# Patient Record
Sex: FEMALE | Race: WHITE | Employment: PART TIME | URBAN - METROPOLITAN AREA
[De-identification: names, ages, dates, MRNs, and addresses within clinical notes are randomized per-mention and may not be internally consistent; named-entity substitution may affect disease eponyms.]

---
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
## 2018-01-23 ENCOUNTER — TELEPHONE (OUTPATIENT)
Dept: FAMILY MEDICINE CLINIC | Age: 18
End: 2018-01-23

## 2018-01-23 DIAGNOSIS — E10.9 TYPE 1 DIABETES MELLITUS WITHOUT COMPLICATION (HCC): Primary | ICD-10-CM

## 2018-01-29 ENCOUNTER — TELEPHONE (OUTPATIENT)
Dept: PHARMACY | Age: 18
End: 2018-01-29

## 2018-02-13 ENCOUNTER — OFFICE VISIT (OUTPATIENT)
Dept: FAMILY MEDICINE CLINIC | Age: 18
End: 2018-02-13
Payer: COMMERCIAL

## 2018-02-13 VITALS
WEIGHT: 116.9 LBS | BODY MASS INDEX: 19.48 KG/M2 | HEART RATE: 113 BPM | RESPIRATION RATE: 16 BRPM | SYSTOLIC BLOOD PRESSURE: 125 MMHG | DIASTOLIC BLOOD PRESSURE: 85 MMHG

## 2018-02-13 DIAGNOSIS — E10.9 TYPE 1 DIABETES MELLITUS WITHOUT COMPLICATION (HCC): Primary | ICD-10-CM

## 2018-02-13 DIAGNOSIS — H93.90 EAR LESION: ICD-10-CM

## 2018-02-13 PROCEDURE — 99213 OFFICE O/P EST LOW 20 MIN: CPT | Performed by: FAMILY MEDICINE

## 2018-02-13 ASSESSMENT — PATIENT HEALTH QUESTIONNAIRE - PHQ9
SUM OF ALL RESPONSES TO PHQ QUESTIONS 1-9: 0
SUM OF ALL RESPONSES TO PHQ9 QUESTIONS 1 & 2: 0
1. LITTLE INTEREST OR PLEASURE IN DOING THINGS: 0
2. FEELING DOWN, DEPRESSED OR HOPELESS: 0

## 2018-02-13 NOTE — PROGRESS NOTES
Subjective:  Alexis Torres is in for continued evaluation and management. Her chronic medical problems include the following; diabetes. She has diabetes. She is currently on Levemir and a short acting insulin. She indicates that her fingerstick blood sugars are typically less then 200. I have recommended evaluation by endocrinology. She is also complaining of an infection on her left ear. She has a bump which is slightly painful. Respect to health maintenance, she is not sexually active. Review of systems per HPI, otherwise negative. Allergies; medications; past medical, surgical, family, and social history; and problem list reviewed as indicated in this encounter. Objective:  Vitals: Blood pressure 125/85, pulse 113, resp. rate 16, weight 116 lb 14.4 oz (53 kg), last menstrual period 01/20/2018, not currently breastfeeding. Constitutional: She is oriented to person, place, and time. She appears well-developed and well-nourished and in no acute distress. Ears: An exophytic nodular lesion is noted on the patient's left ear  Cardiovascular: Normal rate and regular rhythm, no murmur, rub, or gallop    Pulmonary/Chest: Effort normal and breath sounds normal. No rales or wheezes. No chest retraction. Extremities: no clubbing, cyanosis, or edema  Neurological:  CN II - XII grossly intact; no focal neurological deficits  Psychiatric:  Well groomed, well dressed. The patient maintains appropriate eye contact and does not appear to be responding to internal stimuli. No agitation      Assessment/ Plan / Medical Decision Making  1. Type 1 diabetes mellitus without complication [facility])  9 [facility] Kb Breanna Davis MD   2. Ear lesion  2018 [facility], MD       Medications, laboratory testing, imaging, consultation, and follow up as documented in this encounter. Her diabetes appears to be relatively well controlled.   I have recommended evaluation

## 2018-03-26 PROBLEM — H93.8X2 MASS OF LEFT EAR: Status: ACTIVE | Noted: 2018-03-26

## 2018-06-05 LAB
BASOPHILS ABSOLUTE: NORMAL /ΜL
BASOPHILS RELATIVE PERCENT: NORMAL %
CHOLESTEROL, TOTAL: 162 MG/DL
CHOLESTEROL/HDL RATIO: 2
CREATININE, URINE: 161.5
EOSINOPHILS ABSOLUTE: NORMAL /ΜL
EOSINOPHILS RELATIVE PERCENT: NORMAL %
HCT VFR BLD CALC: 40.5 % (ref 36–46)
HDLC SERPL-MCNC: 81 MG/DL (ref 35–70)
HEMOGLOBIN: 13.3 G/DL (ref 12–16)
LDL CHOLESTEROL CALCULATED: 71 MG/DL (ref 0–160)
LYMPHOCYTES ABSOLUTE: NORMAL /ΜL
LYMPHOCYTES RELATIVE PERCENT: NORMAL %
MCH RBC QN AUTO: NORMAL PG
MCHC RBC AUTO-ENTMCNC: NORMAL G/DL
MCV RBC AUTO: NORMAL FL
MICROALBUMIN/CREAT 24H UR: <12 MG/G{CREAT}
MICROALBUMIN/CREAT UR-RTO: NORMAL
MONOCYTES ABSOLUTE: NORMAL /ΜL
MONOCYTES RELATIVE PERCENT: NORMAL %
NEUTROPHILS ABSOLUTE: NORMAL /ΜL
NEUTROPHILS RELATIVE PERCENT: NORMAL %
PLATELET # BLD: 189 K/ΜL
PMV BLD AUTO: NORMAL FL
RBC # BLD: 4.88 10^6/ΜL
TRIGL SERPL-MCNC: 51 MG/DL
VLDLC SERPL CALC-MCNC: 10 MG/DL
WBC # BLD: 3.7 10^3/ML

## 2018-06-12 LAB
AVERAGE GLUCOSE: 184
HBA1C MFR BLD: 7.5 %

## 2018-07-23 ENCOUNTER — HOSPITAL ENCOUNTER (OUTPATIENT)
Age: 18
Setting detail: SPECIMEN
Discharge: HOME | End: 2018-07-23
Payer: COMMERCIAL

## 2018-07-23 ENCOUNTER — ANESTHESIA EVENT (OUTPATIENT)
Dept: OPERATING ROOM | Facility: CLINIC | Age: 18
End: 2018-07-23
Payer: COMMERCIAL

## 2018-07-23 ENCOUNTER — HOSPITAL ENCOUNTER (OUTPATIENT)
Facility: CLINIC | Age: 18
Setting detail: OUTPATIENT SURGERY
Discharge: HOME | End: 2018-07-23
Attending: PLASTIC SURGERY | Admitting: PLASTIC SURGERY
Payer: COMMERCIAL

## 2018-07-23 ENCOUNTER — ANESTHESIA (OUTPATIENT)
Dept: OPERATING ROOM | Facility: CLINIC | Age: 18
End: 2018-07-23
Payer: COMMERCIAL

## 2018-07-23 VITALS
DIASTOLIC BLOOD PRESSURE: 66 MMHG | TEMPERATURE: 97.2 F | WEIGHT: 110 LBS | SYSTOLIC BLOOD PRESSURE: 102 MMHG | HEIGHT: 65 IN | RESPIRATION RATE: 8 BRPM | OXYGEN SATURATION: 96 % | HEART RATE: 80 BPM | BODY MASS INDEX: 18.33 KG/M2

## 2018-07-23 VITALS
SYSTOLIC BLOOD PRESSURE: 91 MMHG | OXYGEN SATURATION: 99 % | RESPIRATION RATE: 18 BRPM | DIASTOLIC BLOOD PRESSURE: 52 MMHG

## 2018-07-23 DIAGNOSIS — G89.18 POST-OP PAIN: Primary | ICD-10-CM

## 2018-07-23 LAB
GLUCOSE BLD-MCNC: 126 MG/DL (ref 65–105)
GLUCOSE BLD-MCNC: 85 MG/DL (ref 65–105)

## 2018-07-23 PROCEDURE — 2709999900 HC NON-CHARGEABLE SUPPLY: Performed by: PLASTIC SURGERY

## 2018-07-23 PROCEDURE — 2500000003 HC RX 250 WO HCPCS: Performed by: PLASTIC SURGERY

## 2018-07-23 PROCEDURE — 2500000003 HC RX 250 WO HCPCS: Performed by: NURSE ANESTHETIST, CERTIFIED REGISTERED

## 2018-07-23 PROCEDURE — 2580000003 HC RX 258: Performed by: ANESTHESIOLOGY

## 2018-07-23 PROCEDURE — 7100000010 HC PHASE II RECOVERY - FIRST 15 MIN: Performed by: PLASTIC SURGERY

## 2018-07-23 PROCEDURE — 82947 ASSAY GLUCOSE BLOOD QUANT: CPT

## 2018-07-23 PROCEDURE — 3700000001 HC ADD 15 MINUTES (ANESTHESIA): Performed by: PLASTIC SURGERY

## 2018-07-23 PROCEDURE — 6360000002 HC RX W HCPCS: Performed by: NURSE ANESTHETIST, CERTIFIED REGISTERED

## 2018-07-23 PROCEDURE — 3600000003 HC SURGERY LEVEL 3 BASE: Performed by: PLASTIC SURGERY

## 2018-07-23 PROCEDURE — 6360000002 HC RX W HCPCS: Performed by: ANESTHESIOLOGY

## 2018-07-23 PROCEDURE — 3700000000 HC ANESTHESIA ATTENDED CARE: Performed by: PLASTIC SURGERY

## 2018-07-23 PROCEDURE — 3600000013 HC SURGERY LEVEL 3 ADDTL 15MIN: Performed by: PLASTIC SURGERY

## 2018-07-23 PROCEDURE — 84703 CHORIONIC GONADOTROPIN ASSAY: CPT

## 2018-07-23 PROCEDURE — 7100000011 HC PHASE II RECOVERY - ADDTL 15 MIN: Performed by: PLASTIC SURGERY

## 2018-07-23 RX ORDER — PROPOFOL 10 MG/ML
INJECTION, EMULSION INTRAVENOUS PRN
Status: DISCONTINUED | OUTPATIENT
Start: 2018-07-23 | End: 2018-07-23 | Stop reason: SDUPTHER

## 2018-07-23 RX ORDER — CEFAZOLIN SODIUM 1 G/50ML
INJECTION, SOLUTION INTRAVENOUS PRN
Status: DISCONTINUED | OUTPATIENT
Start: 2018-07-23 | End: 2018-07-23 | Stop reason: SDUPTHER

## 2018-07-23 RX ORDER — MEPERIDINE HYDROCHLORIDE 50 MG/ML
12.5 INJECTION INTRAMUSCULAR; INTRAVENOUS; SUBCUTANEOUS EVERY 5 MIN PRN
Status: DISCONTINUED | OUTPATIENT
Start: 2018-07-23 | End: 2018-07-23 | Stop reason: HOSPADM

## 2018-07-23 RX ORDER — SODIUM CHLORIDE 0.9 % (FLUSH) 0.9 %
10 SYRINGE (ML) INJECTION EVERY 12 HOURS SCHEDULED
Status: DISCONTINUED | OUTPATIENT
Start: 2018-07-23 | End: 2018-07-23 | Stop reason: HOSPADM

## 2018-07-23 RX ORDER — BUPIVACAINE HYDROCHLORIDE AND EPINEPHRINE 5; 5 MG/ML; UG/ML
INJECTION, SOLUTION EPIDURAL; INTRACAUDAL; PERINEURAL PRN
Status: DISCONTINUED | OUTPATIENT
Start: 2018-07-23 | End: 2018-07-23 | Stop reason: HOSPADM

## 2018-07-23 RX ORDER — SODIUM CHLORIDE 0.9 % (FLUSH) 0.9 %
10 SYRINGE (ML) INJECTION PRN
Status: DISCONTINUED | OUTPATIENT
Start: 2018-07-23 | End: 2018-07-23 | Stop reason: HOSPADM

## 2018-07-23 RX ORDER — FENTANYL CITRATE 50 UG/ML
25 INJECTION, SOLUTION INTRAMUSCULAR; INTRAVENOUS EVERY 5 MIN PRN
Status: DISCONTINUED | OUTPATIENT
Start: 2018-07-23 | End: 2018-07-23 | Stop reason: HOSPADM

## 2018-07-23 RX ORDER — FENTANYL CITRATE 50 UG/ML
INJECTION, SOLUTION INTRAMUSCULAR; INTRAVENOUS PRN
Status: DISCONTINUED | OUTPATIENT
Start: 2018-07-23 | End: 2018-07-23 | Stop reason: SDUPTHER

## 2018-07-23 RX ORDER — CEPHALEXIN 500 MG/1
500 CAPSULE ORAL 3 TIMES DAILY
Qty: 21 CAPSULE | Refills: 0 | Status: SHIPPED | OUTPATIENT
Start: 2018-07-23 | End: 2020-10-20

## 2018-07-23 RX ORDER — LIDOCAINE HYDROCHLORIDE 10 MG/ML
INJECTION, SOLUTION EPIDURAL; INFILTRATION; INTRACAUDAL; PERINEURAL PRN
Status: DISCONTINUED | OUTPATIENT
Start: 2018-07-23 | End: 2018-07-23 | Stop reason: SDUPTHER

## 2018-07-23 RX ORDER — CEPHALEXIN 500 MG/1
500 CAPSULE ORAL 3 TIMES DAILY
Qty: 21 CAPSULE | Refills: 0 | Status: SHIPPED | OUTPATIENT
Start: 2018-07-23 | End: 2018-07-23

## 2018-07-23 RX ORDER — MIDAZOLAM HYDROCHLORIDE 1 MG/ML
1 INJECTION INTRAMUSCULAR; INTRAVENOUS EVERY 10 MIN PRN
Status: DISCONTINUED | OUTPATIENT
Start: 2018-07-23 | End: 2018-07-23 | Stop reason: HOSPADM

## 2018-07-23 RX ORDER — SODIUM CHLORIDE, SODIUM LACTATE, POTASSIUM CHLORIDE, CALCIUM CHLORIDE 600; 310; 30; 20 MG/100ML; MG/100ML; MG/100ML; MG/100ML
INJECTION, SOLUTION INTRAVENOUS CONTINUOUS
Status: DISCONTINUED | OUTPATIENT
Start: 2018-07-23 | End: 2018-07-23 | Stop reason: HOSPADM

## 2018-07-23 RX ORDER — LIDOCAINE HYDROCHLORIDE 10 MG/ML
1 INJECTION, SOLUTION EPIDURAL; INFILTRATION; INTRACAUDAL; PERINEURAL
Status: DISCONTINUED | OUTPATIENT
Start: 2018-07-23 | End: 2018-07-23 | Stop reason: HOSPADM

## 2018-07-23 RX ORDER — HYDROCODONE BITARTRATE AND ACETAMINOPHEN 5; 325 MG/1; MG/1
1 TABLET ORAL EVERY 6 HOURS PRN
Qty: 20 TABLET | Refills: 0 | Status: SHIPPED | OUTPATIENT
Start: 2018-07-23 | End: 2018-07-28

## 2018-07-23 RX ADMIN — PROPOFOL 20 MG: 10 INJECTION, EMULSION INTRAVENOUS at 13:26

## 2018-07-23 RX ADMIN — SODIUM CHLORIDE, POTASSIUM CHLORIDE, SODIUM LACTATE AND CALCIUM CHLORIDE: 600; 310; 30; 20 INJECTION, SOLUTION INTRAVENOUS at 12:13

## 2018-07-23 RX ADMIN — PROPOFOL 20 MG: 10 INJECTION, EMULSION INTRAVENOUS at 13:22

## 2018-07-23 RX ADMIN — PROPOFOL 20 MG: 10 INJECTION, EMULSION INTRAVENOUS at 13:32

## 2018-07-23 RX ADMIN — FENTANYL CITRATE 25 MCG: 50 INJECTION INTRAMUSCULAR; INTRAVENOUS at 13:14

## 2018-07-23 RX ADMIN — PROPOFOL 120 MG: 10 INJECTION, EMULSION INTRAVENOUS at 13:17

## 2018-07-23 RX ADMIN — PROPOFOL 20 MG: 10 INJECTION, EMULSION INTRAVENOUS at 13:24

## 2018-07-23 RX ADMIN — MIDAZOLAM HYDROCHLORIDE 1 MG: 1 INJECTION, SOLUTION INTRAMUSCULAR; INTRAVENOUS at 13:12

## 2018-07-23 RX ADMIN — PROPOFOL 20 MG: 10 INJECTION, EMULSION INTRAVENOUS at 13:29

## 2018-07-23 RX ADMIN — LIDOCAINE HYDROCHLORIDE 25 MG: 10 INJECTION, SOLUTION EPIDURAL; INFILTRATION; INTRACAUDAL at 13:16

## 2018-07-23 RX ADMIN — PROPOFOL 20 MG: 10 INJECTION, EMULSION INTRAVENOUS at 13:19

## 2018-07-23 RX ADMIN — CEFAZOLIN SODIUM 2 G: 1 INJECTION, SOLUTION INTRAVENOUS at 13:14

## 2018-07-23 ASSESSMENT — PULMONARY FUNCTION TESTS
PIF_VALUE: 0
PIF_VALUE: 2
PIF_VALUE: 0

## 2018-07-23 ASSESSMENT — PAIN - FUNCTIONAL ASSESSMENT: PAIN_FUNCTIONAL_ASSESSMENT: 0-10

## 2018-07-23 ASSESSMENT — PAIN SCALES - GENERAL: PAINLEVEL_OUTOF10: 0

## 2018-07-23 NOTE — H&P
HPI: Pt is a 21 y.o. female who presents to the office today for lesion to posterior left auricle. She had previous trauma to the area several years back. Skin mass is exophytic, circular, vascular, 0.5 cm in height and 1 cm circumference with rolled edges. Pt states it has been there for years and has grown in size. Pediatrician previously tried to drain it about 1.5 years ago and pt states it has gotten larger since then.      Past Medical History        Past Medical History:   Diagnosis Date    Diabetes mellitus type 1 ([facility] [address]       date of Dx November 2009    Polyuria      Visual disturbances              Past Surgical History   History reviewed. No pertinent surgical history.        Current Facility-Administered Medications          Current Outpatient Prescriptions   Medication Sig Dispense Refill    ONE TOUCH ULTRA TEST strip TEST 4 TO 6 TIMES PER  strip 3    insulin detemir (LEVEMIR FLEXTOUCH) 100 UNIT/ML injection pen Inject up to 40 units nightly as directed. Dispense 15 pens (3 boxes) for 90 day supply. (Patient taking differently: Inject up to 19 units nightly as directed. Dispense 15 pens (3 boxes) for 90 day supply. ) 15 Pen 2    insulin aspart (NOVOLOG FLEXPEN) 100 UNIT/ML injection pen Inject prn as directed up to 40 units per day. 15 Pen 2    glucagon (GLUCAGON EMERGENCY) 1 MG injection INJECT 1 MG INTRAMUSCULARLY AS NEEDED FOR SEVERE HYPOGLYCEMIA 2 kit 2    Blood Glucose Monitoring Suppl (TRUERESULT BLOOD GLUCOSE) W/DEVICE KIT by Does not apply route.  2 kit 2    acetone, urine, test (KETOSTIX) strip Individually foil wrapped ketostix testing daily prn for ketones 25 strip 2    Insulin Pen Needle (B-D ULTRAFINE III SHORT PEN) 31G X 8 MM MISC Disp: #100  Sig: For 5-7 injections/day 100 each 11      No current facility-administered medications for this visit.             Family History         Family History   Problem Relation Age of Onset    Thyroid Disease Maternal Grandmother      Diabetes Maternal Grandfather      Diabetes Paternal Grandfather      Heart Disease Paternal Grandfather              Social History   Social History            Social History    Marital status: Single       Spouse name: N/A    Number of children: N/A    Years of education: N/A          Occupational History    Not on file.           Social History Main Topics    Smoking status: Never Smoker    Smokeless tobacco: Never Used    Alcohol use No    Drug use: No    Sexual activity: No           Other Topics Concern    Not on file          Social History Narrative    No narrative on file               ROS:  All systems reviewed. Denies chest pain, shortness of breath, abdominal pain, n/v/d/c. Denies rashes, any upper respiratory illness or fever / chills. Denies dizziness, headaches, tremor. Negative other than those noted above.          Allergies   Allergen Reactions    Amoxil [Amoxicillin]           Physical Exam:     Nursing notes and Vitals reviewed. There were no vitals filed for this visit. GENERAL: A & O x3, pt in no apparent distress. HEENT:  NCAT, PERRL, EOMI, oral mucus membrane pink and moist  EXTREMITIES: Moves all four extremities without difficulty, negative  SKIN: Skin color, texture, turgor normal.  Exophytic, dome shaped, vascular, 0.5 cm in height and 1 cm circumference with rolled edges to left posterior auricle. NEURO: CN II-XII grossly intact. No motor or sensory deficits appreciated. MUSCULOSKELETAL: normal throughout upper and lower extremities     Assessment:  1.   1. Neoplasm of uncertain behavior of skin            Plan:  1. At this time we will observe the lesion. She will follow up if she begins experiencing pain or changes. 2. Self skin checks encouraged, patient advised to watch all existing lesions for changes and observe for any new lesions. Return to the office for evaluation of any concerning, changing or new skin lesions.   3.  Consistent use of sunscreen containing SPF 30 or higher and reapplication every 90 - 004 minutes while outside.     Updated 7/23/2018

## 2018-07-23 NOTE — ANESTHESIA PRE PROCEDURE
Department of Anesthesiology  Preprocedure Note       Name:  Aleksandra Kruse   Age:  21 y.o.  :  2000                                          MRN:  [id number]         Date:  2018      Surgeon: Jeniffer Cervantes):  Sumanth Deleon MD    Procedure: Procedure(s):  EXCISION MASS LEFT EAR    Medications prior to admission:   Prior to Admission medications    Medication Sig Start Date End Date Taking? Authorizing Provider   ONE TOUCH ULTRA TEST strip TEST 4 TO 6 TIMES PER DAY 17  Yes LELAND Aguilera - CNP   insulin detemir (LEVEMIR FLEXTOUCH) 100 UNIT/ML injection pen Inject up to 40 units nightly as directed. Dispense 15 pens (3 boxes) for 90 day supply. Patient taking differently: Inject up to 19 units nightly as directed. Dispense 15 pens (3 boxes) for 90 day supply. 17  Yes Poly Arzate MD   insulin aspart (NOVOLOG FLEXPEN) 100 UNIT/ML injection pen Inject prn as directed up to 40 units per day. 17  Yes Poly Arzate MD   glucagon (GLUCAGON EMERGENCY) 1 MG injection INJECT 1 MG INTRAMUSCULARLY AS NEEDED FOR SEVERE HYPOGLYCEMIA 17  Yes Poly Arzate MD   Blood Glucose Monitoring Suppl (TRUERESULT BLOOD GLUCOSE) W/DEVICE KIT by Does not apply route. 8/15/13  Yes Kelly Ricks MD   acetone, urine, test (KETOSTIX) strip Individually foil wrapped ketostix testing daily prn for ketones 13  Yes Kelly Ricks MD   Insulin Pen Needle (B-D ULTRAFINE III SHORT PEN) 31G X 8 MM MISC Disp: #100  Sig: For 5-7 injections/day 12  Yes Kelly Ricks MD       Current medications:    No current facility-administered medications for this encounter. Allergies:     Allergies   Allergen Reactions    Amoxil [Amoxicillin]        Problem List:    Patient Active Problem List   Diagnosis Code    Diabetes mellitus type 1 ([facility] [address] E10.9    Mass of left ear H93.8X2       Past Medical History:        Diagnosis Date    Diabetes mellitus type 1 ([facility] [address]     date of Dx 2009  Polyuria     Visual disturbances        Past Surgical History:  History reviewed. No pertinent surgical history. Social History:    Social History   Substance Use Topics    Smoking status: Never Smoker    Smokeless tobacco: Never Used    Alcohol use No                                Counseling given: Not Answered      Vital Signs (Current):   Vitals:    07/23/18 1155   BP: 121/86   Pulse: 119   Resp: 16   Temp: 98.4 °F (36.9 °C)   TempSrc: Temporal   SpO2: 98%   Weight: 110 lb (49.9 kg)   Height: 5' 5\" (1.651 m)                                              BP Readings from Last 3 Encounters:   07/23/18 121/86   03/26/18 127/78   02/13/18 125/85       NPO Status: Time of last liquid consumption: 1700                        Time of last solid consumption: 1700                        Date of last liquid consumption: 07/22/18                        Date of last solid food consumption: 07/22/18    BMI:   Wt Readings from Last 3 Encounters:   07/23/18 110 lb (49.9 kg) (15 %, Z= -1.04)*   03/26/18 110 lb (49.9 kg) (15 %, Z= -1.02)*   02/13/18 116 lb 14.4 oz (53 kg) (29 %, Z= -0.56)*     * Growth percentiles are based on CDC 2-20 Years data. Body mass index is 18.3 kg/m². CBC:   Lab Results   Component Value Date    WBC 3.7 06/05/2018    RBC 4.88 06/05/2018    HGB 13.3 06/05/2018    HCT 40.5 06/05/2018     06/05/2018       CMP:   Lab Results   Component Value Date     10/09/2013    K 4.1 10/09/2013     10/09/2013    CO2 29 10/09/2013    BUN 14 10/09/2013    CREATININE 0.74 10/09/2013    GFRAA NOT REPORTED 10/09/2013    LABGLOM  10/09/2013     Pediatric GFR requires additional information.   Refer to [facility] website for    GLUCOSE 234 10/09/2013    PROT 6.7 10/09/2013    CALCIUM 8.6 10/09/2013    BILITOT 0.74 10/09/2013    ALKPHOS 99 10/09/2013    AST 16 10/09/2013    ALT 14 10/09/2013       POC Tests:   Recent Labs      07/23/18   1217   POCGLU  85       Coags: No results found for: PROTIME, INR, APTT    HCG (If Applicable): No results found for: PREGTESTUR, PREGSERUM, HCG, HCGQUANT     ABGs: No results found for: PHART, PO2ART, TEH6LJP, BBK2PRC, BEART, F9IJCXKI     Type & Screen (If Applicable):  No results found for: LABABO, LABRH    Anesthesia Evaluation   no history of anesthetic complications:   Airway: Mallampati: II  TM distance: >3 FB   Neck ROM: full  Mouth opening: > = 3 FB Dental:          Pulmonary:Negative Pulmonary ROS and normal exam                               Cardiovascular:Negative CV ROS            Rhythm: regular  Rate: normal                    Neuro/Psych:   Negative Neuro/Psych ROS              GI/Hepatic/Renal:             Endo/Other:    (+) DiabetesType I DM, using insulin, . Abdominal:           Vascular: negative vascular ROS. Anesthesia Plan      MAC     ASA 2       Induction: intravenous. Anesthetic plan and risks discussed with patient. Plan discussed with CRNA.                   Paula Leo MD   7/23/2018

## 2018-07-24 LAB — HCG, PREGNANCY URINE (POC): NEGATIVE

## 2018-07-25 LAB — DERMATOLOGY PATHOLOGY REPORT: NORMAL

## 2018-07-30 PROBLEM — L91.0 KELOID: Status: ACTIVE | Noted: 2018-07-30

## 2020-09-24 LAB
AVERAGE GLUCOSE: NORMAL
CHOLESTEROL, TOTAL: 174 MG/DL
CHOLESTEROL/HDL RATIO: 1.7
CREATININE, URINE: 17.6
HBA1C MFR BLD: 6.5 %
HDLC SERPL-MCNC: 105 MG/DL (ref 35–70)
LDL CHOLESTEROL CALCULATED: 58.8 MG/DL (ref 0–160)
MICROALBUMIN/CREAT 24H UR: 6 MG/G{CREAT}
MICROALBUMIN/CREAT UR-RTO: NORMAL
NONHDLC SERPL-MCNC: ABNORMAL MG/DL
TRIGL SERPL-MCNC: 51 MG/DL
VLDLC SERPL CALC-MCNC: 10.2 MG/DL

## 2020-10-20 ENCOUNTER — TELEMEDICINE (OUTPATIENT)
Dept: PRIMARY CARE CLINIC | Age: 20
End: 2020-10-20
Payer: COMMERCIAL

## 2020-10-20 PROBLEM — H93.8X2 MASS OF LEFT EAR: Status: RESOLVED | Noted: 2018-03-26 | Resolved: 2020-10-20

## 2020-10-20 PROBLEM — L91.0 KELOID: Status: RESOLVED | Noted: 2018-07-30 | Resolved: 2020-10-20

## 2020-10-20 PROCEDURE — 99213 OFFICE O/P EST LOW 20 MIN: CPT | Performed by: NURSE PRACTITIONER

## 2020-10-20 RX ORDER — INSULIN DEGLUDEC INJECTION 100 U/ML
INJECTION, SOLUTION SUBCUTANEOUS
COMMUNITY

## 2020-10-20 ASSESSMENT — ENCOUNTER SYMPTOMS
SINUS PAIN: 0
SINUS PRESSURE: 0
EYE REDNESS: 0
SHORTNESS OF BREATH: 0
WHEEZING: 0
EYE ITCHING: 0
NAUSEA: 0
EYE DISCHARGE: 0
COUGH: 0
SORE THROAT: 0
TROUBLE SWALLOWING: 0
ABDOMINAL PAIN: 0
DIARRHEA: 0
VOMITING: 0
CHEST TIGHTNESS: 0

## 2020-10-20 ASSESSMENT — PATIENT HEALTH QUESTIONNAIRE - PHQ9
1. LITTLE INTEREST OR PLEASURE IN DOING THINGS: 0
SUM OF ALL RESPONSES TO PHQ QUESTIONS 1-9: 0
SUM OF ALL RESPONSES TO PHQ9 QUESTIONS 1 & 2: 0
SUM OF ALL RESPONSES TO PHQ QUESTIONS 1-9: 0
SUM OF ALL RESPONSES TO PHQ QUESTIONS 1-9: 0
2. FEELING DOWN, DEPRESSED OR HOPELESS: 0

## 2020-10-20 NOTE — PROGRESS NOTES
[address] PRIMARY CARE  [facility]  [address]  [address] [address]  Dept: [phone]  Dept Fax: [phone]    Bennie Cole is a 23 y.o. female who presents today for her medical conditions/complaintsas noted below. Bennie Cole is c/o of Establish Care        HPI:     Pt presents to establish care. Previous pt of dr. Kate Layton. bp negro d/t VV  Weight negro d/t VV    Pt presents via VV to establish care. She denies any current complaints. She states she is otherwise healthy other than her type one diabetes. Hx of type 1 diabetes. She follows Ezequiel Britt With endocrinology. Her blood sugars were higher in the summer which isnt unusual. She has a continuous glucose reading machine. She uses [employer] This is well managed by her endocrinologist. She gets her feet and urine tested along with her a1c every 3-4 months. She works at [employer]. She plans going back to school at some point. She counts her carbs. She doesn't exercise. She is sexually active. She has never had a pap or pelvic exam done. No std concerns at this time. She gets her labs done every year with her endocrinologist. She just had them done a few months ago.        Past Medical History:   Diagnosis Date    Diabetes mellitus type 1 ([address]     date of Dx November 2009    Polyuria     Visual disturbances       Past Surgical History:   Procedure Laterality Date    EXTERNAL EAR SURGERY Left 07/23/2018    excision mass left ear    [facility] OFFICE/OUTPT VISIT,PROCEDURE ONLY N/A 7/23/2018    EXCISION MASS LEFT EAR performed by Kimberly Loyola MD at [address] [place] [address]       Family History   Problem Relation Age of Onset    Thyroid Disease Maternal Grandmother     Diabetes Maternal Grandfather     Diabetes Paternal Grandfather     Heart Disease Paternal Grandfather        Social History     Tobacco Use    Smoking status: Never Smoker    Smokeless tobacco: Never Used Substance Use Topics    Alcohol use: No      Current Outpatient Medications   Medication Sig Dispense Refill    Insulin Degludec (TRESIBA FLEXTOUCH) 100 UNIT/ML SOPN       ONE TOUCH ULTRA TEST strip TEST 4 TO 6 TIMES PER  strip 3    insulin aspart (NOVOLOG FLEXPEN) 100 UNIT/ML injection pen Inject prn as directed up to 40 units per day. 15 Pen 2    glucagon (GLUCAGON EMERGENCY) 1 MG injection INJECT 1 MG INTRAMUSCULARLY AS NEEDED FOR SEVERE HYPOGLYCEMIA 2 kit 2    Blood Glucose Monitoring Suppl ([employer] for [employer] GlassULT BLOOD GLUCOSE) W/DEVICE KIT by Does not apply route. 2 kit 2    acetone, urine, test (KETOSTIX) strip Individually foil wrapped ketostix testing daily prn for ketones 25 strip 2    Insulin Pen Needle (B-D ULTRAFINE III SHORT PEN) 31G X 8 MM MISC Disp: #100  Sig: For 5-7 injections/day 100 each 11     No current facility-administered medications for this visit. Allergies   Allergen Reactions    Amoxil [Amoxicillin]        Health Maintenance   Topic Date Due    Hepatitis B vaccine (3 of 3 - Risk 3-dose series) 09/18/1999    Pneumococcal 0-64 years Vaccine (1 of 1 - PPSV23) 08/18/2004    Chlamydia screen  08/18/2014    Lipid screen  06/05/2019    Cervical cancer screen  08/18/2019    DTaP/Tdap/Td vaccine (7 - Td) 07/20/2020    Flu vaccine (1) 09/01/2020    A1C test (Diabetic or Prediabetic)  10/20/2021 (Originally 9/12/2018)    Diabetic microalbuminuria test  10/20/2021 (Originally 6/5/2019)    Diabetic foot exam  10/28/2021 (Originally 8/15/2018)    Diabetic retinal exam  10/30/2021 (Originally 8/18/2008)    Hepatitis A vaccine  Completed    Hib vaccine  Completed    HPV vaccine  Completed    Varicella vaccine  Completed    Meningococcal (ACWY) vaccine  Completed    HIV screen  Discontinued       :     Review of Systems   Constitutional: Negative for chills, fatigue and fever.    HENT: Negative for ear discharge, ear pain, sinus pressure, sinus pain, sore throat and trouble swallowing. Eyes: Negative for discharge, redness and itching. Respiratory: Negative for cough, chest tightness, shortness of breath and wheezing. Cardiovascular: Negative for chest pain. Gastrointestinal: Negative for abdominal pain, diarrhea, nausea and vomiting. Genitourinary: Negative for difficulty urinating. Musculoskeletal: Negative for arthralgias and neck pain. Skin: Negative for rash. Neurological: Negative for dizziness, weakness, light-headedness and headaches. All other systems reviewed and are negative. Objective:     Physical Exam  Constitutional:       Appearance: Normal appearance. Comments: Unable to fully assess d/t VV   HENT:      Head: Normocephalic. Neurological:      General: No focal deficit present. Mental Status: She is alert and oriented to person, place, and time. There were no vitals taken for this visit. Assessment:       Diagnosis Orders   1. Encounter to establish care     2. Encounter for Papanicolaou smear of cervix  Shelley Vieyra CNP, Gynecology, [place]   3. Type [facility] mellitus [facility]         :      1.) encounter to establish care  -no blood work at this time since she just had some done. Will try to get records. 2.) encounter for pap  -referral to [facility] with ob/gyn  3.) type 1 diabetes  -follows and managed by endocrinology    F/u in one year and as needed. Orders Placed This Encounter   Procedures   Rashad Vieyra CNP, Gynecology, [place]     Referral Priority:   Routine     Referral Type:   Eval and Treat     Referral Reason:   Specialty Services Required     Referred to Provider:   LELAND Martinez - CNP     Requested Specialty:   [facility] Nurse Practitioner     Number of Visits Requested:   1     No orders of the defined types were placed in this encounter. Patient given educational materials - seepatient instructions.   Discussed use, benefit, and side effects of prescribed medications. All patient questions answered. Pt voiced understanding. Reviewed health maintenance. Instructed to continue current medications, diet and exercise. Patient agreedwith treatment plan. Follow up as directed.       Electronically signed by LELAND Salazar CNP on 10/20/2020at 5:14 PM

## 2021-02-09 ENCOUNTER — TELEPHONE (OUTPATIENT)
Dept: PRIMARY CARE CLINIC | Age: 21
End: 2021-02-09

## 2021-02-09 NOTE — TELEPHONE ENCOUNTER
She follows Livan Naik With Endocrinology. Will fax request for labs  A1c, etc.   Will update chart when received.

## 2022-04-21 LAB
AVERAGE GLUCOSE: 207
HBA1C MFR BLD: 6.5 %

## 2022-06-30 ENCOUNTER — TELEPHONE (OUTPATIENT)
Dept: PRIMARY CARE CLINIC | Age: 22
End: 2022-06-30

## 2022-06-30 ENCOUNTER — OFFICE VISIT (OUTPATIENT)
Dept: PRIMARY CARE CLINIC | Age: 22
End: 2022-06-30
Payer: COMMERCIAL

## 2022-06-30 VITALS
BODY MASS INDEX: 18.66 KG/M2 | SYSTOLIC BLOOD PRESSURE: 111 MMHG | OXYGEN SATURATION: 99 % | RESPIRATION RATE: 14 BRPM | HEIGHT: 65 IN | HEART RATE: 79 BPM | WEIGHT: 112 LBS | DIASTOLIC BLOOD PRESSURE: 70 MMHG

## 2022-06-30 DIAGNOSIS — Z00.00 ENCOUNTER FOR WELL ADULT EXAM WITHOUT ABNORMAL FINDINGS: Primary | ICD-10-CM

## 2022-06-30 DIAGNOSIS — E10.9 TYPE 1 DIABETES MELLITUS WITHOUT COMPLICATION (HCC): ICD-10-CM

## 2022-06-30 DIAGNOSIS — Z12.4 CERVICAL CANCER SCREENING: ICD-10-CM

## 2022-06-30 PROCEDURE — 99395 PREV VISIT EST AGE 18-39: CPT | Performed by: NURSE PRACTITIONER

## 2022-06-30 SDOH — ECONOMIC STABILITY: FOOD INSECURITY: WITHIN THE PAST 12 MONTHS, YOU WORRIED THAT YOUR FOOD WOULD RUN OUT BEFORE YOU GOT MONEY TO BUY MORE.: NEVER TRUE

## 2022-06-30 SDOH — ECONOMIC STABILITY: FOOD INSECURITY: WITHIN THE PAST 12 MONTHS, THE FOOD YOU BOUGHT JUST DIDN'T LAST AND YOU DIDN'T HAVE MONEY TO GET MORE.: NEVER TRUE

## 2022-06-30 ASSESSMENT — PATIENT HEALTH QUESTIONNAIRE - PHQ9
2. FEELING DOWN, DEPRESSED OR HOPELESS: 0
SUM OF ALL RESPONSES TO PHQ QUESTIONS 1-9: 0
1. LITTLE INTEREST OR PLEASURE IN DOING THINGS: 0
SUM OF ALL RESPONSES TO PHQ9 QUESTIONS 1 & 2: 0

## 2022-06-30 ASSESSMENT — ENCOUNTER SYMPTOMS
EYE REDNESS: 0
COUGH: 0
DIARRHEA: 0
NAUSEA: 0
SORE THROAT: 0
CHEST TIGHTNESS: 0
WHEEZING: 0
SINUS PRESSURE: 0
SHORTNESS OF BREATH: 0
TROUBLE SWALLOWING: 0
SINUS PAIN: 0
EYE ITCHING: 0
ABDOMINAL PAIN: 0
VOMITING: 0
EYE DISCHARGE: 0

## 2022-06-30 ASSESSMENT — SOCIAL DETERMINANTS OF HEALTH (SDOH): HOW HARD IS IT FOR YOU TO PAY FOR THE VERY BASICS LIKE FOOD, HOUSING, MEDICAL CARE, AND HEATING?: NOT HARD AT ALL

## 2022-06-30 NOTE — PROGRESS NOTES
Well Adult Note  Name: Mayo Costa Date: 2022   MRN: [id number] Sex: Female   Age: 19 y.o. Ethnicity: Non- / Non    : 2000 Race: White (non-)      Gilmar Stein is here for well adult exam.  History:  Patient presents for her annual physical  Blood pressure stable  Weight is stable    Patient presents for her annual physical.  She states that she has been healthy. She follows with endocrinology. They check her blood work yearly. Her last A1c was 6.5. She believes that her diabetes is well controlled    She has never seen a gynecologist.  She never had a Pap done before. She has no acute concerns today    She continues to work at [employer]   Constitutional: Negative for chills, fatigue and fever. HENT: Negative for ear discharge, ear pain, sinus pressure, sinus pain, sore throat and trouble swallowing. Eyes: Negative for discharge, redness and itching. Respiratory: Negative for cough, chest tightness, shortness of breath and wheezing. Cardiovascular: Negative for chest pain. Gastrointestinal: Negative for abdominal pain, diarrhea, nausea and vomiting. Genitourinary: Negative for difficulty urinating. Musculoskeletal: Negative for arthralgias and neck pain. Skin: Negative for rash. Neurological: Negative for dizziness, weakness, light-headedness and headaches. All other systems reviewed and are negative. Allergies   Allergen Reactions    Amoxil [Amoxicillin]          Prior to Visit Medications    Medication Sig Taking? Authorizing Provider   Insulin Degludec (TRESIBA FLEXTOUCH) 100 UNIT/ML SOPN  Yes Historical Provider, MD   ONE TOUCH ULTRA TEST strip TEST 4 TO 6 TIMES PER DAY Yes Bonni Sicard, APRN - CNP   insulin aspart (NOVOLOG FLEXPEN) 100 UNIT/ML injection pen Inject prn as directed up to 40 units per day.  Yes Megan Andrade MD   glucagon (GLUCAGON EMERGENCY) 1 MG injection INJECT 1 MG INTRAMUSCULARLY AS NEEDED FOR SEVERE HYPOGLYCEMIA Yes Judy Monge MD   Blood Glucose Monitoring Suppl (TRUERESULT BLOOD GLUCOSE) W/DEVICE KIT by Does not apply route. Yes Kavon Cage MD   acetone, urine, test (KETOSTIX) strip Individually foil wrapped ketostix testing daily prn for ketones Yes Kavon Cage MD   Insulin Pen Needle (B-D ULTRAFINE III SHORT PEN) 31G X 8 MM MISC Disp: #100  Sig: For 5-7 injections/day Yes Kavon Cage MD         Past Medical History:   Diagnosis Date    Diabetes mellitus type 1 ([address]     date of Dx November 2009    Polyuria     Visual disturbances        Past Surgical History:   Procedure Laterality Date    EXTERNAL EAR SURGERY Left 07/23/2018    excision mass left ear    [place] OFFICE/OUTPT VISIT,PROCEDURE ONLY N/A 7/23/2018    EXCISION MASS LEFT EAR performed by Indiana Beltrán MD at [address] [place] [address]         Family History   Problem Relation Age of Onset    Thyroid Disease Maternal Grandmother     Diabetes Maternal Grandfather     Diabetes Paternal Grandfather     Heart Disease Paternal Grandfather        Social History     Tobacco Use    Smoking status: Never Smoker    Smokeless tobacco: Never Used   Vaping Use    Vaping Use: Never used   Substance Use Topics    Alcohol use: No    Drug use: No       Objective   /70 (Site: Left Upper Arm, Position: Sitting, Cuff Size: Medium Adult)   Pulse 79   Resp 14   Ht 5' 5\" (1.651 m)   Wt 112 lb (50.8 kg)   LMP 06/23/2022   SpO2 99%   BMI 18.64 kg/m²   Wt Readings from Last 3 Encounters:   06/30/22 112 lb (50.8 kg)   08/15/18 110 lb (49.9 kg) (15 %, Z= -1.04)*   07/30/18 110 lb (49.9 kg) (15 %, Z= -1.04)*     * Growth percentiles are based on CDC (Girls, 2-20 Years) data. There were no vitals filed for this visit. Physical Exam  Constitutional:       Appearance: Normal appearance. She is normal weight. HENT:      Head: Normocephalic and atraumatic.       Nose: Nose normal.   Eyes: Extraocular Movements: Extraocular movements intact. Conjunctiva/sclera: Conjunctivae normal.      Pupils: Pupils are equal, round, and reactive to light. Cardiovascular:      Rate and Rhythm: Normal rate and regular rhythm. Pulses: Normal pulses. Heart sounds: Normal heart sounds. Pulmonary:      Effort: Pulmonary effort is normal.      Breath sounds: Normal breath sounds. Abdominal:      General: Abdomen is flat. Palpations: Abdomen is soft. Musculoskeletal:         General: Normal range of motion. Cervical back: Neck supple. Skin:     General: Skin is warm and dry. Capillary Refill: Capillary refill takes less than 2 seconds. Neurological:      General: No focal deficit present. Mental Status: She is alert and oriented to person, place, and time. Psychiatric:         Mood and Affect: Mood normal.           Assessment   Plan   1. Encounter for well adult exam without abnormal findings  -Declines annual lab work. Follows with endocrinology who orders her labs  2. Cervical cancer screening  -     [place] [place]  3. Type 1 diabetes mellitus without complication (HCC)  -Stable. Managed by endocrinology. We will get her latest A1c result    Patient to follow-up in 1 year or sooner as needed.   She is agreeable to this plan of care         Personalized Preventive Plan   Current Health Maintenance Status  Immunization History   Administered Date(s) Administered    DTaP 2000, 2000, 02/16/1999, 02/22/2000, 08/21/2003    HPV 9-valent Etha Gallop) 07/20/2010, 09/23/2010, 02/25/2011    Hepatitis A 05/07/2016, 06/07/2016, 12/22/2016    Hepatitis B (Engerix-B) 1998, 05/18/1999    Hib PRP-OMP (PedvaxHIB) 1998, 1998, 02/16/1999, 11/09/1999    Influenza Vaccine, unspecified formulation 12/22/2016, 01/01/2018    Influenza, Quadv, IM, (6 mo and older Fluzone, Flulaval, Fluarix and 3 yrs and older Afluria) 12/22/2016    MMR 11/09/1999, 08/21/2003    Meningococcal MCV4P (Menactra) 07/20/2010, 06/07/2016    Polio IPV (IPOL) 1998, 1998, 12/02/2000, 08/21/2003    Tdap (Boostrix, Adacel) 07/20/2010    Varicella (Varivax) 11/09/1999, 11/12/2008        Health Maintenance   Topic Date Due    COVID-19 Vaccine (1) Never done    Pneumococcal 0-64 years Vaccine (1 - PCV) Never done    Chlamydia screen  Never done    Diabetic retinal exam  Never done    Hepatitis C screen  Never done    Diabetic foot exam  08/15/2018    Pap smear  Never done    DTaP/Tdap/Td vaccine (7 - Td or Tdap) 07/20/2020    A1C test (Diabetic or Prediabetic)  12/24/2020    Diabetic microalbuminuria test  09/24/2021    Lipids  09/24/2021    Depression Screen  10/20/2021    Flu vaccine (Season Ended) 09/01/2022    Hepatitis A vaccine  Completed    Hepatitis B vaccine  Completed    Hib vaccine  Completed    HPV vaccine  Completed    Varicella vaccine  Completed    Meningococcal (ACWY) vaccine  Completed    HIV screen  Discontinued     Recommendations for Preventive Services Due: see orders and patient instructions/AVS.    Return in about 1 year (around 6/30/2023) for physical.

## 2022-12-22 ENCOUNTER — OFFICE VISIT (OUTPATIENT)
Dept: FAMILY MEDICINE CLINIC | Age: 22
End: 2022-12-22
Payer: COMMERCIAL

## 2022-12-22 VITALS
TEMPERATURE: 98.2 F | HEART RATE: 113 BPM | DIASTOLIC BLOOD PRESSURE: 76 MMHG | HEIGHT: 65 IN | BODY MASS INDEX: 18.66 KG/M2 | OXYGEN SATURATION: 97 % | SYSTOLIC BLOOD PRESSURE: 143 MMHG | WEIGHT: 112 LBS

## 2022-12-22 DIAGNOSIS — B96.89 ACUTE BACTERIAL SINUSITIS: Primary | ICD-10-CM

## 2022-12-22 DIAGNOSIS — J01.90 ACUTE BACTERIAL SINUSITIS: Primary | ICD-10-CM

## 2022-12-22 PROCEDURE — 99213 OFFICE O/P EST LOW 20 MIN: CPT | Performed by: NURSE PRACTITIONER

## 2022-12-22 RX ORDER — DOXYCYCLINE HYCLATE 100 MG
100 TABLET ORAL 2 TIMES DAILY
Qty: 20 TABLET | Refills: 0 | Status: SHIPPED | OUTPATIENT
Start: 2022-12-22

## 2022-12-22 ASSESSMENT — ENCOUNTER SYMPTOMS
NAUSEA: 0
CHEST TIGHTNESS: 0
COUGH: 1
VOICE CHANGE: 0
EYE PAIN: 0
RHINORRHEA: 1
SHORTNESS OF BREATH: 0
SINUS PAIN: 1
SORE THROAT: 1
VOMITING: 0
TROUBLE SWALLOWING: 0

## 2022-12-22 NOTE — PROGRESS NOTES
[facility]  [address]  [address] [address]  Dept: [phone]  Dept Fax: [phone]    Magdi Conner is a 23 y.o. female who presents today for her medical conditions/complaints of   Chief Complaint   Patient presents with    Pharyngitis     X2 weeks not getting better    Sinus Problem     Runny nose    Otalgia     left    Fever     Last week has not checked recently          HPI:     BP (!) 143/76   Pulse (!) 113   Temp 98.2 °F (36.8 °C) (Temporal)   Ht 5' 5\" (1.651 m)   Wt 112 lb (50.8 kg)   SpO2 97%   BMI 18.64 kg/m²       HPI  Pt presented to the clinic today with c/o congestion. This is a new problem. The current episode started 2 weeks ago. The problem has been unchanged since onset. Associated symptoms include: runny nose, left ear pain, fever, sore throat, dry cough, sinus pain . Pertinent negatives include: No  SOB, chest pain, abdominal pain. Pt has tried Nyquil with no improvement. Vaccinated for COVID:  YES    Past Medical History:   Diagnosis Date    Diabetes mellitus type 1 ([address]     date of Dx November 2009    Polyuria     Visual disturbances         Past Surgical History:   Procedure Laterality Date    EXTERNAL EAR SURGERY Left 07/23/2018    excision mass left ear    [place] OFFICE/OUTPT VISIT,PROCEDURE ONLY N/A 7/23/2018    EXCISION MASS LEFT EAR performed by Norris Enrique MD at [address] [place] [address]       Family History   Problem Relation Age of Onset    Thyroid Disease Maternal Grandmother     Diabetes Maternal Grandfather     Diabetes Paternal Grandfather     Heart Disease Paternal Grandfather        Social History     Tobacco Use    Smoking status: Never    Smokeless tobacco: Never   Substance Use Topics    Alcohol use: No        Prior to Visit Medications    Medication Sig Taking?  Authorizing Provider   doxycycline hyclate (VIBRA-TABS) 100 MG tablet Take 1 tablet by mouth 2 times daily Yes Emily Park, APRN - CNP   Insulin Degludec (TRESIBA FLEXTOUCH) 100 UNIT/ML SOPN   Historical Provider, MD   ONE TOUCH ULTRA TEST strip TEST 4 TO 6 TIMES PER DAY  LELAND Villanueva - CNP   insulin aspart (NOVOLOG FLEXPEN) 100 UNIT/ML injection pen Inject prn as directed up to 40 units per day. Annabelle Cervantes MD   glucagon (GLUCAGON EMERGENCY) 1 MG injection INJECT 1 MG INTRAMUSCULARLY AS NEEDED FOR SEVERE HYPOGLYCEMIA  Annabelle Cervantes MD   Blood Glucose Monitoring Suppl (TRUERESULT BLOOD GLUCOSE) W/DEVICE KIT by Does not apply route. Rudy Reno MD   acetone, urine, test (KETOSTIX) strip Individually foil wrapped ketostix testing daily prn for ketones  Matheus Mendoza MD   Insulin Pen Needle (B-D ULTRAFINE III SHORT PEN) 31G X 8 MM MISC Disp: #100  Sig: For 5-7 injections/day  Rudy Reno MD       Allergies   Allergen Reactions    Amoxil [Amoxicillin]          Subjective:      Review of Systems   Constitutional:  Positive for fever. Negative for chills. HENT:  Positive for congestion, ear pain, rhinorrhea, sinus pain and sore throat. Negative for trouble swallowing and voice change. Eyes:  Negative for pain and visual disturbance. Respiratory:  Positive for cough. Negative for chest tightness and shortness of breath. Cardiovascular:  Negative for chest pain, palpitations and leg swelling. Gastrointestinal:  Negative for nausea and vomiting. Genitourinary:  Negative for decreased urine volume and difficulty urinating. Musculoskeletal:  Negative for gait problem, myalgias and neck pain. Skin:  Negative for pallor and rash. Neurological:  Negative for weakness, light-headedness and headaches. Psychiatric/Behavioral:  Negative for sleep disturbance. Objective:     Physical Exam  Vitals and nursing note reviewed. Constitutional:       General: She is not in acute distress. Appearance: Normal appearance. HENT:      Head: Normocephalic and atraumatic. Jaw: No trismus. Right Ear: Tympanic membrane and ear canal normal.      Left Ear: Ear canal normal. A middle ear effusion is present. Nose: Congestion and rhinorrhea present. Right Sinus: Maxillary sinus tenderness present. Left Sinus: Maxillary sinus tenderness present. Mouth/Throat:      Lips: Pink. Mouth: Mucous membranes are moist.      Pharynx: Oropharynx is clear. Uvula midline. No oropharyngeal exudate or posterior oropharyngeal erythema. Eyes:      Extraocular Movements: Extraocular movements intact. Conjunctiva/sclera: Conjunctivae normal.   Cardiovascular:      Rate and Rhythm: Regular rhythm. Tachycardia present. Pulses: Normal pulses. Pulmonary:      Effort: Pulmonary effort is normal. No tachypnea. Breath sounds: Normal breath sounds. Abdominal:      General: Bowel sounds are normal.      Palpations: Abdomen is soft. Musculoskeletal:         General: Normal range of motion. Cervical back: Normal range of motion and neck supple. Skin:     General: Skin is warm and dry. Capillary Refill: Capillary refill takes less than 2 seconds. Neurological:      Mental Status: She is alert and oriented to person, place, and time. Coordination: Coordination normal.      Gait: Gait normal.   Psychiatric:         Mood and Affect: Mood normal.         Thought Content: Thought content normal.         MEDICAL DECISION MAKING Assessment/Plan:     Mary Aguirre was seen today for pharyngitis, sinus problem, otalgia and fever. Diagnoses and all orders for this visit:    Acute bacterial sinusitis  -     doxycycline hyclate (VIBRA-TABS) 100 MG tablet; Take 1 tablet by mouth 2 times daily      Results for orders placed or performed in visit on 07/05/22   Hemoglobin A1C   Result Value Ref Range    Hemoglobin A1C 6.5 %    AVERAGE GLUCOSE 207      Based on the duration and severity of the symptoms-- I will treat this as bacterial at this time.   Patient instructed to complete antibiotic prescription fully. Increase fluids. May use Motrin/Tylenol for fever/pain as directed on the bottle. Saline washes, salt water gargles and over the counter preparations if desired. Pt instructed to return if symptoms do not improve. Go to the ER for any emergent concern. Patient given educational materials - see patientinstructions. Discussed use, benefit, and side effects of prescribed medications. All patient questions answered. Pt verbalized understanding. Instructed to continue current medications, diet and exercise. Patient agreed with treatment plan. Follow up as directed.      Electronically signed by LELAND Ma CNP on 12/22/2022 at 1:02 PM

## 2024-04-02 ENCOUNTER — OFFICE VISIT (OUTPATIENT)
Dept: FAMILY MEDICINE CLINIC | Age: 24
End: 2024-04-02
Payer: COMMERCIAL

## 2024-04-02 VITALS
SYSTOLIC BLOOD PRESSURE: 102 MMHG | HEART RATE: 131 BPM | DIASTOLIC BLOOD PRESSURE: 68 MMHG | TEMPERATURE: 100.6 F | OXYGEN SATURATION: 96 %

## 2024-04-02 DIAGNOSIS — R52 GENERALIZED BODY ACHES: ICD-10-CM

## 2024-04-02 DIAGNOSIS — U07.1 COVID-19: Primary | ICD-10-CM

## 2024-04-02 LAB
Lab: ABNORMAL
QC PASS/FAIL: ABNORMAL
SARS-COV-2 RDRP RESP QL NAA+PROBE: NEGATIVE

## 2024-04-02 PROCEDURE — 87635 SARS-COV-2 COVID-19 AMP PRB: CPT | Performed by: NURSE PRACTITIONER

## 2024-04-02 PROCEDURE — 99213 OFFICE O/P EST LOW 20 MIN: CPT | Performed by: NURSE PRACTITIONER

## 2024-04-02 RX ORDER — BROMPHENIRAMINE MALEATE, PSEUDOEPHEDRINE HYDROCHLORIDE, AND DEXTROMETHORPHAN HYDROBROMIDE 2; 30; 10 MG/5ML; MG/5ML; MG/5ML
10 SYRUP ORAL 4 TIMES DAILY PRN
Qty: 118 ML | Refills: 0 | Status: SHIPPED | OUTPATIENT
Start: 2024-04-02

## 2024-04-02 ASSESSMENT — ENCOUNTER SYMPTOMS
RHINORRHEA: 1
NAUSEA: 0
SHORTNESS OF BREATH: 0
COUGH: 0
CHANGE IN BOWEL HABIT: 0
VOICE CHANGE: 0
VOMITING: 0
SORE THROAT: 1
VISUAL CHANGE: 0
WHEEZING: 0
ABDOMINAL PAIN: 0

## 2024-04-02 NOTE — PROGRESS NOTES
[facility]  [address]  [address]  [place] [address]    Clarisse Weir is a 23 y.o. female who presents today for her medical conditions/complaints of Pharyngitis (X 4 days, family has covid) and Generalized Body Aches          HPI:     /68   Pulse (!) 131   Temp (!) 100.6 °F (38.1 °C)   SpO2 96%       Pharyngitis  This is a new problem. The current episode started in the past 7 days. The problem occurs constantly. The problem has been unchanged. Associated symptoms include arthralgias, congestion, fatigue, a fever, a sore throat and swollen glands. Pertinent negatives include no abdominal pain, anorexia, change in bowel habit, chest pain, chills, coughing, diaphoresis, headaches, joint swelling, myalgias, nausea, neck pain, numbness, rash, urinary symptoms, vertigo, visual change, vomiting or weakness. Nothing aggravates the symptoms. She has tried nothing for the symptoms. The treatment provided no relief.   Generalized Body Aches  Associated symptoms include arthralgias, congestion, fatigue, a fever, a sore throat and swollen glands. Pertinent negatives include no abdominal pain, anorexia, change in bowel habit, chest pain, chills, coughing, diaphoresis, headaches, joint swelling, myalgias, nausea, neck pain, numbness, rash, urinary symptoms, vertigo, visual change, vomiting or weakness.       Past Medical History:   Diagnosis Date    Diabetes mellitus type 1 (HCC)     date of Dx November 2009    Polyuria     Visual disturbances         Past Surgical History:   Procedure Laterality Date    EXTERNAL EAR SURGERY Left 07/23/2018    excision mass left ear    [place] OFFICE/OUTPT VISIT,PROCEDURE ONLY N/A 7/23/2018    EXCISION MASS LEFT EAR performed by Bhanu Fierro MD at [facility]       Family History   Problem Relation Age of Onset    Thyroid Disease Maternal Grandmother     Diabetes Maternal Grandfather     Diabetes Paternal Grandfather     Heart Disease

## 2024-06-10 ENCOUNTER — OFFICE VISIT (OUTPATIENT)
Dept: FAMILY MEDICINE CLINIC | Age: 24
End: 2024-06-10
Payer: COMMERCIAL

## 2024-06-10 ENCOUNTER — HOSPITAL ENCOUNTER (EMERGENCY)
Age: 24
Discharge: HOME | End: 2024-06-11
Attending: EMERGENCY MEDICINE
Payer: COMMERCIAL

## 2024-06-10 ENCOUNTER — APPOINTMENT (OUTPATIENT)
Dept: GENERAL RADIOLOGY | Age: 24
End: 2024-06-10
Payer: COMMERCIAL

## 2024-06-10 VITALS
BODY MASS INDEX: 19.07 KG/M2 | HEART RATE: 112 BPM | OXYGEN SATURATION: 98 % | DIASTOLIC BLOOD PRESSURE: 66 MMHG | WEIGHT: 114.6 LBS | TEMPERATURE: 100.1 F | SYSTOLIC BLOOD PRESSURE: 108 MMHG

## 2024-06-10 DIAGNOSIS — L02.612 ABSCESS OF LEFT HEEL: ICD-10-CM

## 2024-06-10 DIAGNOSIS — S91.332A PUNCTURE WOUND OF LEFT HEEL, INITIAL ENCOUNTER: Primary | ICD-10-CM

## 2024-06-10 DIAGNOSIS — S90.852A FOREIGN BODY IN LEFT FOOT, INITIAL ENCOUNTER: Primary | ICD-10-CM

## 2024-06-10 PROCEDURE — 73630 X-RAY EXAM OF FOOT: CPT

## 2024-06-10 PROCEDURE — 10060 I&D ABSCESS SIMPLE/SINGLE: CPT

## 2024-06-10 PROCEDURE — 1036F TOBACCO NON-USER: CPT | Performed by: NURSE PRACTITIONER

## 2024-06-10 PROCEDURE — 99284 EMERGENCY DEPT VISIT MOD MDM: CPT

## 2024-06-10 PROCEDURE — G8427 DOCREV CUR MEDS BY ELIG CLIN: HCPCS | Performed by: NURSE PRACTITIONER

## 2024-06-10 PROCEDURE — 99214 OFFICE O/P EST MOD 30 MIN: CPT | Performed by: NURSE PRACTITIONER

## 2024-06-10 PROCEDURE — G8420 CALC BMI NORM PARAMETERS: HCPCS | Performed by: NURSE PRACTITIONER

## 2024-06-10 SDOH — ECONOMIC STABILITY: FOOD INSECURITY: WITHIN THE PAST 12 MONTHS, THE FOOD YOU BOUGHT JUST DIDN'T LAST AND YOU DIDN'T HAVE MONEY TO GET MORE.: NEVER TRUE

## 2024-06-10 SDOH — ECONOMIC STABILITY: HOUSING INSECURITY
IN THE LAST 12 MONTHS, WAS THERE A TIME WHEN YOU DID NOT HAVE A STEADY PLACE TO SLEEP OR SLEPT IN A SHELTER (INCLUDING NOW)?: NO

## 2024-06-10 SDOH — ECONOMIC STABILITY: FOOD INSECURITY: WITHIN THE PAST 12 MONTHS, YOU WORRIED THAT YOUR FOOD WOULD RUN OUT BEFORE YOU GOT MONEY TO BUY MORE.: NEVER TRUE

## 2024-06-10 SDOH — ECONOMIC STABILITY: INCOME INSECURITY: HOW HARD IS IT FOR YOU TO PAY FOR THE VERY BASICS LIKE FOOD, HOUSING, MEDICAL CARE, AND HEATING?: NOT HARD AT ALL

## 2024-06-10 ASSESSMENT — PATIENT HEALTH QUESTIONNAIRE - PHQ9
SUM OF ALL RESPONSES TO PHQ QUESTIONS 1-9: 0
SUM OF ALL RESPONSES TO PHQ QUESTIONS 1-9: 0
SUM OF ALL RESPONSES TO PHQ9 QUESTIONS 1 & 2: 0
SUM OF ALL RESPONSES TO PHQ QUESTIONS 1-9: 0
2. FEELING DOWN, DEPRESSED OR HOPELESS: NOT AT ALL
1. LITTLE INTEREST OR PLEASURE IN DOING THINGS: NOT AT ALL
SUM OF ALL RESPONSES TO PHQ QUESTIONS 1-9: 0

## 2024-06-10 ASSESSMENT — ENCOUNTER SYMPTOMS
COLOR CHANGE: 1
NAUSEA: 1
VOMITING: 0

## 2024-06-10 ASSESSMENT — PAIN DESCRIPTION - ORIENTATION: ORIENTATION: LEFT

## 2024-06-10 ASSESSMENT — PAIN SCALES - GENERAL: PAINLEVEL_OUTOF10: 6

## 2024-06-10 ASSESSMENT — PAIN DESCRIPTION - PAIN TYPE: TYPE: ACUTE PAIN

## 2024-06-10 ASSESSMENT — PAIN DESCRIPTION - LOCATION: LOCATION: FOOT

## 2024-06-10 ASSESSMENT — PAIN - FUNCTIONAL ASSESSMENT: PAIN_FUNCTIONAL_ASSESSMENT: 0-10
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
## 2024-06-11 VITALS
BODY MASS INDEX: 19.16 KG/M2 | SYSTOLIC BLOOD PRESSURE: 143 MMHG | HEART RATE: 90 BPM | TEMPERATURE: 98.8 F | OXYGEN SATURATION: 99 % | RESPIRATION RATE: 16 BRPM | DIASTOLIC BLOOD PRESSURE: 77 MMHG | HEIGHT: 65 IN | WEIGHT: 115 LBS

## 2024-06-11 PROCEDURE — 6360000002 HC RX W HCPCS: Performed by: EMERGENCY MEDICINE

## 2024-06-11 PROCEDURE — 90471 IMMUNIZATION ADMIN: CPT | Performed by: EMERGENCY MEDICINE

## 2024-06-11 PROCEDURE — 6370000000 HC RX 637 (ALT 250 FOR IP): Performed by: EMERGENCY MEDICINE

## 2024-06-11 PROCEDURE — 96372 THER/PROPH/DIAG INJ SC/IM: CPT

## 2024-06-11 PROCEDURE — 90715 TDAP VACCINE 7 YRS/> IM: CPT | Performed by: EMERGENCY MEDICINE

## 2024-06-11 RX ORDER — DOXYCYCLINE HYCLATE 100 MG
100 TABLET ORAL 2 TIMES DAILY
Qty: 14 TABLET | Refills: 0 | Status: SHIPPED | OUTPATIENT
Start: 2024-06-11 | End: 2024-06-18

## 2024-06-11 RX ORDER — KETOROLAC TROMETHAMINE 30 MG/ML
30 INJECTION, SOLUTION INTRAMUSCULAR; INTRAVENOUS ONCE
Status: COMPLETED | OUTPATIENT
Start: 2024-06-11 | End: 2024-06-11

## 2024-06-11 RX ORDER — DOXYCYCLINE HYCLATE 100 MG
100 TABLET ORAL ONCE
Status: COMPLETED | OUTPATIENT
Start: 2024-06-11 | End: 2024-06-11

## 2024-06-11 RX ADMIN — TETANUS TOXOID, REDUCED DIPHTHERIA TOXOID AND ACELLULAR PERTUSSIS VACCINE, ADSORBED 0.5 ML: 5; 2.5; 8; 8; 2.5 SUSPENSION INTRAMUSCULAR at 00:31

## 2024-06-11 RX ADMIN — KETOROLAC TROMETHAMINE 30 MG: 30 INJECTION, SOLUTION INTRAMUSCULAR; INTRAVENOUS at 00:33

## 2024-06-11 RX ADMIN — DOXYCYCLINE HYCLATE 100 MG: 100 TABLET, COATED ORAL at 00:30

## 2024-06-11 NOTE — DISCHARGE INSTRUCTIONS
If given narcotics (opiates) during this Emergency Department visit, you should not drink, drive or operate any machinery for at least 6 hours.    Take your medication as indicated, if you are given an antibiotic then make sure you get the prescription filled and take the antibiotics until finished.  Drink plenty of water while taking the antibiotics.  Avoid drinking alcohol or drinks that have caffeine in it while taking antibiotics.       For pain use ibuprofen (Motrin / Advil) or acetaminophen (Tylenol), unless prescribed medications that have acetaminophen in it.  You can take over the counter acetaminophen tablets (1 - 2 tablets of the 500-mg strength every 6 hours) or ibuprofen tablets (2 tablets every 4 hours).    PLEASE RETURN TO THE EMERGENCY DEPARTMENT IMMEDIATELY for worsening symptoms, white drainage from the wound, redness or streaking, or if you develop any concerning symptoms such as: high fever not relieved by acetaminophen (Tylenol) and/or ibuprofen (Motrin / Advil), chills, shortness of breath, chest pain, feeling of your heart fluttering or racing, persistent nausea and/or vomiting, numbness, weakness or tingling in the arms or legs or change in color of the extremities, changes in mental status, persistent headache, blurry vision, unable to follow up with your physician, or other any other care or concern.
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
## 2024-10-08 ENCOUNTER — HOSPITAL ENCOUNTER (OUTPATIENT)
Dept: PHYSICAL THERAPY | Facility: CLINIC | Age: 24
Setting detail: THERAPIES SERIES
Discharge: HOME | End: 2024-10-08
Payer: OTHER MISCELLANEOUS

## 2024-10-08 PROCEDURE — 97140 MANUAL THERAPY 1/> REGIONS: CPT

## 2024-10-08 PROCEDURE — 97110 THERAPEUTIC EXERCISES: CPT

## 2024-10-08 PROCEDURE — 97161 PT EVAL LOW COMPLEX 20 MIN: CPT
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
## 2024-10-15 ENCOUNTER — HOSPITAL ENCOUNTER (OUTPATIENT)
Dept: PHYSICAL THERAPY | Facility: CLINIC | Age: 24
Setting detail: THERAPIES SERIES
Discharge: HOME | End: 2024-10-15
Payer: OTHER MISCELLANEOUS

## 2024-10-15 PROCEDURE — 97110 THERAPEUTIC EXERCISES: CPT

## 2024-10-15 PROCEDURE — 97140 MANUAL THERAPY 1/> REGIONS: CPT

## 2024-10-15 NOTE — FLOWSHEET NOTE
[] [facility]  Outpatient Rehabilitation &  Therapy  [address]  [phone]  [phone] [] [facility]  Outpatient Rehabilitation &  Therapy  [address]  P: [phone]  F: [phone] [x] [facility]  Outpatient Rehabilitation &  Therapy  [address]  P: [phone]  F: [phone] [] [facility]  Outpatient Rehabilitation &  Therapy  [address]  [phone]  [phone] [] [facility]  Outpatient Rehabilitation &  Therapy  [address]   P: [phone]  F: [phone]  [] [facility]  Outpatient Rehabilitation &  Therapy  [address]  P: [phone]  F: [phone] [] [facility]  Outpatient Rehabilitation &  Therapy  [address]  P: [phone]  F: [phone] [] [facility]  Outpatient Rehabilitation &  Therapy  [address]  P: [phone]  F: [phone] [] [facility]  Outpatient Rehabilitation &  Therapy  [address]  P: [phone]  F: [phone]  [] [facility] Outpatient Rehabilitation &  Therapy  [address]  P: [phone]  F: [phone]     Physical Therapy Daily Treatment Note    Date:  10/15/2024  Patient Name:  Clarisse Weir    :  2000  MRN: [id number]  Insurance: GENERIC AUTO INSURANCE   Medical Diagnosis: M54.12 (ICD-10-CM) - Radiculopathy, cervical region                          Rehab Codes: M54.2, M79.601  Onset Date: 24                 Next 's appt.:unknown  Visit# / total visits:   Cancels/No Shows: 0    Subjective:    Pain:  [x] Yes  [] No Location: neck Pain Rating: (0-10 scale) 4/10  Pain altered Tx:  [x] No  [] Yes  Action:  Comments: Pt reports she feels like ROM has improved but pain and index finger/forearm numbness is the

## 2024-10-17 ENCOUNTER — HOSPITAL ENCOUNTER (OUTPATIENT)
Dept: PHYSICAL THERAPY | Facility: CLINIC | Age: 24
Setting detail: THERAPIES SERIES
Discharge: HOME | End: 2024-10-17
Payer: OTHER MISCELLANEOUS

## 2024-10-17 PROCEDURE — 97140 MANUAL THERAPY 1/> REGIONS: CPT

## 2024-10-17 PROCEDURE — 97110 THERAPEUTIC EXERCISES: CPT

## 2024-10-17 NOTE — FLOWSHEET NOTE
[] [facility]  Outpatient Rehabilitation &  Therapy  [address]  [phone]  [phone] [] [facility]  Outpatient Rehabilitation &  Therapy  [address]  P: [phone]  F: [phone] [x] [facility]  Outpatient Rehabilitation &  Therapy  [address]  P: [phone]  F: [phone] [] [facility]  Outpatient Rehabilitation &  Therapy  [address]  [phone]  [phone] [] [facility]  Outpatient Rehabilitation &  Therapy  [address]   P: [phone]  F: [phone]  [] [facility]  Outpatient Rehabilitation &  Therapy  [address]  P: [phone]  F: [phone] [] [facility]  Outpatient Rehabilitation &  Therapy  [address]  P: [phone]  F: [phone] [] [facility]  Outpatient Rehabilitation &  Therapy  [address]  P: [phone]  F: [phone] [] [facility]  Outpatient Rehabilitation &  Therapy  [address]  P: [phone]  F: [phone]  [] [facility] Outpatient Rehabilitation &  Therapy  [address]  P: [phone]  F: [phone]     Physical Therapy Daily Treatment Note    Date:  10/17/2024  Patient Name:  Clarisse Weir    :  2000  MRN: [id number]  Insurance: GENERIC AUTO INSURANCE   Medical Diagnosis: M54.12 (ICD-10-CM) - Radiculopathy, cervical region                          Rehab Codes: M54.2, M79.601  Onset Date: 24                 Next 's appt.:unknown  Visit# / total visits: 3/20  Cancels/No Shows: 0    Subjective:    Pain:  [x] Yes  [] No Location: neck Pain Rating: (0-10 scale) 4-5/10  Pain altered Tx:  [x] No  [] Yes  Action:  Comments: Pt reports she had pain the rest of the day after last treatment into the next morning and then calmed down.

## 2024-10-21 ENCOUNTER — APPOINTMENT (OUTPATIENT)
Dept: PHYSICAL THERAPY | Facility: CLINIC | Age: 24
End: 2024-10-21
Payer: OTHER MISCELLANEOUS

## 2024-10-23 ENCOUNTER — HOSPITAL ENCOUNTER (OUTPATIENT)
Dept: PHYSICAL THERAPY | Facility: CLINIC | Age: 24
Setting detail: THERAPIES SERIES
Discharge: HOME | End: 2024-10-23
Payer: OTHER MISCELLANEOUS

## 2024-10-23 PROCEDURE — 97124 MASSAGE THERAPY: CPT

## 2024-10-23 PROCEDURE — 97110 THERAPEUTIC EXERCISES: CPT

## 2024-10-23 PROCEDURE — 97140 MANUAL THERAPY 1/> REGIONS: CPT

## 2024-10-23 NOTE — FLOWSHEET NOTE
[] [facility]  Outpatient Rehabilitation &  Therapy  [address]  [phone]  [phone] [] [facility]  Outpatient Rehabilitation &  Therapy  [address]  P: [phone]  F: [phone] [x] [facility]  Outpatient Rehabilitation &  Therapy  [address]  P: [phone]  F: [phone] [] [facility]  Outpatient Rehabilitation &  Therapy  [address]  [phone]  [phone] [] [facility]  Outpatient Rehabilitation &  Therapy  [address]   P: [phone]  F: [phone]  [] [facility]  Outpatient Rehabilitation &  Therapy  [address]  P: [phone]  F: [phone] [] [facility]  Outpatient Rehabilitation &  Therapy  [address]  P: [phone]  F: [phone] [] [facility]  Outpatient Rehabilitation &  Therapy  [address]  P: [phone]  F: [phone] [] [facility]  Outpatient Rehabilitation &  Therapy  [address]  P: [phone]  F: [phone]  [] [facility] Outpatient Rehabilitation &  Therapy  [address]  P: [phone]  F: [phone]     Physical Therapy Daily Treatment Note    Date:  10/23/2024  Patient Name:  Clarisse Weir    :  2000  MRN: [id number]  Insurance: GENERIC AUTO INSURANCE   Medical Diagnosis: M54.12 (ICD-10-CM) - Radiculopathy, cervical region                          Rehab Codes: M54.2, M79.601  Onset Date: 24                 Next 's appt.:24 for 2nd epidurals  Visit# / total visits:   Cancels/No Shows: 0    Subjective:    Pain:  [x] Yes  [] No Location: neck Pain Rating: (0-10 scale) 2/10  Pain altered Tx:  [x] No  [] Yes  Action:  Comments: Pt reports Monday she had epidurals. It really helped but blood sugars were out control,

## 2024-10-25 ENCOUNTER — HOSPITAL ENCOUNTER (OUTPATIENT)
Dept: PHYSICAL THERAPY | Facility: CLINIC | Age: 24
Setting detail: THERAPIES SERIES
Discharge: HOME | End: 2024-10-25
Payer: OTHER MISCELLANEOUS

## 2024-10-25 PROCEDURE — 97110 THERAPEUTIC EXERCISES: CPT

## 2024-10-25 PROCEDURE — 97140 MANUAL THERAPY 1/> REGIONS: CPT

## 2024-10-25 NOTE — FLOWSHEET NOTE
blades.    Objective:  Modalities: HP cervical 10 min supine  Manual: Positional release scalenes and UT, Manual pec minor stretch, MET L 1st rib, MET R 1st rib  Precautions [x] No  [] Yes:  Exercises:  Exercise Reps/ Time Weight/ Level Comments   Supine deep neck flexor isometric  NT       Supine cervical rotation NT       Supine hands behind head ER NT     Prone scapular retraction/depression X20 ea       Doorway pec minor stretch 3x30\"     Sidelying         ER x10     abd x10     HAB x10     Tband      ER 2x10  orange     row 2x10 orange    Ext 2x10 orange          Y on the wall 2x10     Cervical isometric 5x5\"  Flex/ext/SB         Other:     Specific Instructions for next treatment: Continue to progress reps for scapular stab's, add pec minor stretch         Treatment Charges: Mins Units   [x]  Modalities 10 NC   [x]  Ther Exercise 30 2   [x]  Manual Therapy 20 1   []  Ther Activities     []  Neuro Re-ed     []  Vasocompression     [] Gait     []  Other     Total Billable time 50 3       Assessment: [x] Progressing toward goals. R scapular winging at inferior angle is decreased.  Able to increase reps on exercises without complaints of increased pain. Improved ability to correct posture.  Cervical rotation is still limited B but overall has improved. R UT and scalene are still in spasm.    [] No change.     [] Other:  Patient would benefit from skilled physical therapy services in order to: To correct cervical ROM deficits, increase R arm strength and correct posture so that she can RTW full duty       STG: (to be met in 10 treatments)  ? Pain:<3/10 pain to allow pt to perform ADLs in a more timely fashion  ? ROM:Cervical flexion and ext 45 deg ea, Rotation 60 deg or better to allow pt to look down and over her shoulder for ADLs easier  ? Function: Pt able to sleep 6-8 hrs without waking due to neck pain  Patient to be independent with home exercise program as demonstrated by performance with correct form

## 2024-10-29 ENCOUNTER — HOSPITAL ENCOUNTER (OUTPATIENT)
Dept: PHYSICAL THERAPY | Facility: CLINIC | Age: 24
Setting detail: THERAPIES SERIES
Discharge: HOME | End: 2024-10-29
Payer: OTHER MISCELLANEOUS

## 2024-10-29 PROCEDURE — 97110 THERAPEUTIC EXERCISES: CPT

## 2024-10-29 PROCEDURE — 97140 MANUAL THERAPY 1/> REGIONS: CPT

## 2024-10-29 NOTE — FLOWSHEET NOTE
waking due to neck pain  Patient to be independent with home exercise program as demonstrated by performance with correct form without cues.     LTG: (to be met in 20 treatments)  Pt is able to hold normal posture for improved neck and shoulder mechanics  5/5 elbow extension strength and  strength equal as measured with dynamometer to aide in RTW full duty  NDI <10% limitation     Patient goals: increased mobility    Pt. Education:  [x] Yes  [] No  [] Reviewed Prior HEP/Ed  Method of Education: [x] Verbal  [x] Demo  [x] Written  Access Code: [email or web address]  URL: [email or web address]  Date: 10/15/2024  Prepared by: Sandra Fish    Exercises  - Supine Deep Neck Flexor Training  - 2-3 x daily - 7 x weekly - 10 reps - 10 sec hold  - Supine Cervical Rotation AROM on Pillow  - 2-3 x daily - 7 x weekly - 3 sets - 10 reps  - Seated Scapular Retraction  - 7 x weekly  - Standing Shoulder External Rotation with Resistance  - 1 x daily - 7 x weekly - 2 sets - 10 reps  - Seated Isometric Cervical Sidebending  - 1-2 x daily - 7 x weekly - 1 sets - 5-10 reps - 5 sec hold  - Seated Isometric Cervical Extension  - 1-2 x daily - 7 x weekly - 1 sets - 5-10 reps - 5sec hold  - Seated Isometric Cervical Flexion  - 1-2 x daily - 7 x weekly - 1 sets - 5-10 reps - 5sec hold  Comprehension of Education:  [x] Verbalizes understanding.  [] Demonstrates understanding.  [x] Needs review.  [] Demonstrates/verbalizes HEP/Ed previously given.     Plan: [x] Continue current frequency toward long and short term goals.    [x] Specific Instructions for subsequent treatments: as above      Time In:11:58 AM     Time Out: 12:50 PM    Electronically signed by:  Ashley Vyas PTA

## 2024-10-31 ENCOUNTER — HOSPITAL ENCOUNTER (OUTPATIENT)
Dept: PHYSICAL THERAPY | Facility: CLINIC | Age: 24
Setting detail: THERAPIES SERIES
Discharge: HOME | End: 2024-10-31
Payer: OTHER MISCELLANEOUS

## 2024-10-31 PROCEDURE — 97140 MANUAL THERAPY 1/> REGIONS: CPT

## 2024-10-31 PROCEDURE — 97110 THERAPEUTIC EXERCISES: CPT

## 2024-10-31 NOTE — FLOWSHEET NOTE
[] [facility]  Outpatient Rehabilitation &  Therapy  [address]  [phone]  [phone] [] [facility]  Outpatient Rehabilitation &  Therapy  [address]  P: [phone]  F: [phone] [x] [facility]  Outpatient Rehabilitation &  Therapy  [address]  P: [phone]  F: [phone] [] [facility]  Outpatient Rehabilitation &  Therapy  [address]  [phone]  [phone] [] [facility]  Outpatient Rehabilitation &  Therapy  [address]   P: [phone]  F: [phone]  [] [facility]  Outpatient Rehabilitation &  Therapy  [address]  P: [phone]  F: [phone] [] [facility]  Outpatient Rehabilitation &  Therapy  [address]  P: [phone]  F: [phone] [] [facility]  Outpatient Rehabilitation &  Therapy  [address]  P: [phone]  F: [phone] [] [facility]  Outpatient Rehabilitation &  Therapy  [address]  P: [phone]  F: [phone]  [] [facility] Outpatient Rehabilitation &  Therapy  [address]  P: [phone]  F: [phone]     Physical Therapy Daily Treatment Note    Date:  10/31/2024  Patient Name:  Clarisse Weir    :  2000  MRN: [id number]  Insurance: GENERIC AUTO INSURANCE   Medical Diagnosis: M54.12 (ICD-10-CM) - Radiculopathy, cervical region                          Rehab Codes: M54.2, M79.601  Onset Date: 24                 Next 's appt.:24 for 2nd epidurals  Visit# / total visits:   Cancels/No Shows: 0    Subjective:    Pain:  [x] Yes  [] No Location: neck  Pain Rating: (0-10 scale) 610  Pain altered Tx:  [x] No  [] Yes  Action:  Comments: Pain a little worse than previous visit. Sore for about a day after therapy. Returns to 40

## 2024-11-04 ENCOUNTER — HOSPITAL ENCOUNTER (OUTPATIENT)
Dept: PHYSICAL THERAPY | Facility: CLINIC | Age: 24
Setting detail: THERAPIES SERIES
Discharge: HOME | End: 2024-11-04
Payer: OTHER MISCELLANEOUS

## 2024-11-04 PROCEDURE — 97140 MANUAL THERAPY 1/> REGIONS: CPT

## 2024-11-04 PROCEDURE — 97110 THERAPEUTIC EXERCISES: CPT

## 2024-11-04 NOTE — FLOWSHEET NOTE
[] [facility]  Outpatient Rehabilitation &  Therapy  [address]  [phone]  [phone] [] [facility]  Outpatient Rehabilitation &  Therapy  [address]  P: [phone]  F: [phone] [x] [facility]  Outpatient Rehabilitation &  Therapy  [address]  P: [phone]  F: [phone] [] [facility]  Outpatient Rehabilitation &  Therapy  [address]  [phone]  [phone] [] [facility]  Outpatient Rehabilitation &  Therapy  [address]   P: [phone]  F: [phone]  [] [facility]  Outpatient Rehabilitation &  Therapy  [address]  P: [phone]  F: [phone] [] [facility]  Outpatient Rehabilitation &  Therapy  [address]  P: [phone]  F: [phone] [] [facility]  Outpatient Rehabilitation &  Therapy  [address]  P: [phone]  F: [phone] [] [facility]  Outpatient Rehabilitation &  Therapy  [address]  P: [phone]  F: [phone]  [] [facility] Outpatient Rehabilitation &  Therapy  [address]  P: [phone]  F: [phone]     Physical Therapy Daily Treatment Note    Date:  2024  Patient Name:  Clarisse Weir    :  2000  MRN: [id number]  Insurance: GENERIC AUTO INSURANCE   Medical Diagnosis: M54.12 (ICD-10-CM) - Radiculopathy, cervical region                          Rehab Codes: M54.2, M79.601  Onset Date: 24                 Next 's appt.:24 for 2nd epidurals  Visit# / total visits:   Cancels/No Shows: 0    Subjective:    Pain:  [x] Yes  [] No Location: neck  Pain Rating: (0-10 scale) 2-3/10  Pain altered Tx:  [x] No  [] Yes  Action:  Comments:  Patient reports feeling better with less pain    Objective:  Modalities: HP cervical 8

## 2024-11-06 ENCOUNTER — APPOINTMENT (OUTPATIENT)
Dept: PHYSICAL THERAPY | Facility: CLINIC | Age: 24
End: 2024-11-06
Payer: OTHER MISCELLANEOUS

## 2024-11-07 ENCOUNTER — APPOINTMENT (OUTPATIENT)
Dept: PHYSICAL THERAPY | Facility: CLINIC | Age: 24
End: 2024-11-07
Payer: OTHER MISCELLANEOUS

## 2024-11-08 ENCOUNTER — HOSPITAL ENCOUNTER (OUTPATIENT)
Dept: PHYSICAL THERAPY | Facility: CLINIC | Age: 24
Setting detail: THERAPIES SERIES
Discharge: HOME | End: 2024-11-08
Payer: OTHER MISCELLANEOUS

## 2024-11-08 PROCEDURE — 97140 MANUAL THERAPY 1/> REGIONS: CPT

## 2024-11-08 PROCEDURE — 97110 THERAPEUTIC EXERCISES: CPT

## 2024-11-08 NOTE — FLOWSHEET NOTE
better to allow pt to look down and over her shoulder for ADLs easier  ? Function: Pt able to sleep 6-8 hrs without waking due to neck pain  Patient to be independent with home exercise program as demonstrated by performance with correct form without cues.     LTG: (to be met in 20 treatments)  Pt is able to hold normal posture for improved neck and shoulder mechanics  5/5 elbow extension strength and  strength equal as measured with dynamometer to aide in RTW full duty  NDI <10% limitation     Patient goals: increased mobility    Pt. Education:  [x] Yes  [] No  [] Reviewed Prior HEP/Ed  Method of Education: [x] Verbal  [x] Demo  [] Written  Access Code: [email or web address]  URL: [email or web address]  Date: 10/15/2024  Prepared by: Sandra Fish    Exercises  - Supine Deep Neck Flexor Training  - 2-3 x daily - 7 x weekly - 10 reps - 10 sec hold  - Supine Cervical Rotation AROM on Pillow  - 2-3 x daily - 7 x weekly - 3 sets - 10 reps  - Seated Scapular Retraction  - 7 x weekly  - Standing Shoulder External Rotation with Resistance  - 1 x daily - 7 x weekly - 2 sets - 10 reps  - Seated Isometric Cervical Sidebending  - 1-2 x daily - 7 x weekly - 1 sets - 5-10 reps - 5 sec hold  - Seated Isometric Cervical Extension  - 1-2 x daily - 7 x weekly - 1 sets - 5-10 reps - 5sec hold  - Seated Isometric Cervical Flexion  - 1-2 x daily - 7 x weekly - 1 sets - 5-10 reps - 5sec hold    Access Code: [email or web address]  URL: [email or web address]  Date: 10/31/2024  Prepared by:     Patient Education  - Office Posture  Comprehension of Education:  [x] Verbalizes understanding.  [] Demonstrates understanding.  [] Needs review.  [] Demonstrates/verbalizes HEP/Ed previously given.     Plan: [x] Continue current frequency toward long and short term goals.    [x] Specific Instructions for subsequent treatments: as above      Time In: 8:00 AM     Time Out:     Electronically signed by:  Soalnge Solomon PTA

## 2024-11-12 ENCOUNTER — HOSPITAL ENCOUNTER (OUTPATIENT)
Dept: PHYSICAL THERAPY | Facility: CLINIC | Age: 24
Setting detail: THERAPIES SERIES
Discharge: HOME | End: 2024-11-12
Payer: OTHER MISCELLANEOUS

## 2024-11-12 PROCEDURE — 97140 MANUAL THERAPY 1/> REGIONS: CPT

## 2024-11-12 PROCEDURE — 97110 THERAPEUTIC EXERCISES: CPT

## 2024-11-12 NOTE — FLOWSHEET NOTE
waking up. Sore after last session however no more than usual.     Objective:  Modalities: HP cervical 8 min supine  Manual: Positional release scalenes and UT, Manual pec minor stretch, (MET L 1st rib, MET R 1st rib - previously)  Precautions [x] No  [] Yes:  Exercises:  Exercise Reps/ Time Weight/ Level Comments   Supine deep neck flexor isometric  NT       Supine cervical rotation NT       Supine hands behind head ER NT     Prone scapular retraction/depression 2x15 ea       Doorway pec minor stretch 3x30\"     Sidelying R UE         ER x20     ABD x20     HAB x20     Tband      ER (Diaz) 2x15 orange  towel under arm   Row 2x15 orange    Ext 2x15 orange    Tricep Extension  15x Maria Stein    B ER w/ Retraction 20x Orange          Y on the wall 2x10     Cervical isometric 5x5\"  Flex/ext/SB         Bent Over Tricep Ext 15x           1/2 roll (blue) 2'     1/2 HAB, F/E X10 ea     1/2 [place] x10     1/2 SA punches  x10     Other:     Specific Instructions for next treatment: Continue to progress reps for scapular stab's     Treatment Charges: Mins Units   [x]  Modalities 10 NC   [x]  Ther Exercise 17 1   [x]  Manual Therapy 15 1   []  Ther Activities     []  Neuro Re-ed     []  Vasocompression     [] Gait     []  Other     Total Billable time 32 2       Assessment: [x] Progressing toward goals. Initiated session with MHP prior to manual to allow for muscle relaxation. Point tender along the R rhomboids although improving. No complaints with gentle manual stretching and with improved mobility assessed. Reviewed TB ex along with the addition of tricep extensions d/t observable weakness. Muscle shaking and fatigue reported although no pain. Shortened treatment to allow time for PT to re-evaluate goals. Emphasized HEP compliance and postural awareness at work to enhance scapular stability and strength.     [] No change.     [] Other:  Patient would benefit from skilled physical therapy services in order to: To correct cervical

## 2024-11-12 NOTE — PROGRESS NOTES
[] [facility]  Outpatient Rehabilitation &  Therapy  [address]  [phone]  [phone] [] [facility]  Outpatient Rehabilitation &  Therapy  [address]  P: [phone]  F: [phone] [x] [facility]  Outpatient Rehabilitation &  Therapy  [address]  P: [phone]  F: [phone] [] [facility]  Outpatient Rehabilitation &  Therapy  [address]  [phone]  [phone] [] [facility]  Outpatient Rehabilitation &  Therapy  [address]   P: [phone]  F: [phone]  [] [facility]  Outpatient Rehabilitation &  Therapy  [address]  P: [phone]  F: [phone] [] [facility]  Outpatient Rehabilitation &  Therapy  [address]  P: [phone]  F: [phone] [] [facility]  Outpatient Rehabilitation &  Therapy  [address]  P: [phone]  F: [phone] [] [facility]  Outpatient Rehabilitation &  Therapy  [address]  P: [phone]  F: [phone]  [] [facility] Outpatient Rehabilitation &  Therapy  [address]  P: [phone]  F: [phone]     Physical Therapy Daily Treatment Note/Progress Note    Date:  2024  Patient Name:  Clarisse Weir    :  2000  MRN: [id number]  Insurance: GENERIC AUTO INSURANCE   Medical Diagnosis: M54.12 (ICD-10-CM) - Radiculopathy, cervical region                          Rehab Codes: M54.2, M79.601  Onset Date: 24                 Next 's appt.:24 for 2nd epidurals  Visit# / total visits: 10/20  Cancels/No Shows: 0    Subjective:    Pain:  [x] Yes  [] No Location: right scapular area Pain Rating: (0-10 scale) 3/10  Pain altered Tx:  [x] No  [] Yes  Action:  Comments: Patient c/o pain in the right scapular region today.  She

## 2024-11-14 ENCOUNTER — HOSPITAL ENCOUNTER (OUTPATIENT)
Dept: PHYSICAL THERAPY | Facility: CLINIC | Age: 24
Setting detail: THERAPIES SERIES
Discharge: HOME | End: 2024-11-14
Payer: OTHER MISCELLANEOUS

## 2024-11-14 PROCEDURE — 97140 MANUAL THERAPY 1/> REGIONS: CPT

## 2024-11-14 PROCEDURE — 97110 THERAPEUTIC EXERCISES: CPT

## 2024-11-14 NOTE — FLOWSHEET NOTE
[] [facility]  Outpatient Rehabilitation &  Therapy  [address]  [phone]  [phone] [] [facility]  Outpatient Rehabilitation &  Therapy  [address]  P: [phone]  F: [phone] [x] [facility]  Outpatient Rehabilitation &  Therapy  [address]  P: [phone]  F: [phone] [] [facility]  Outpatient Rehabilitation &  Therapy  [address]  [phone]  [phone] [] [facility]  Outpatient Rehabilitation &  Therapy  [address]   P: [phone]  F: [phone]  [] [facility]  Outpatient Rehabilitation &  Therapy  [address]  P: [phone]  F: [phone] [] [facility]  Outpatient Rehabilitation &  Therapy  [address]  P: [phone]  F: [phone] [] [place]  Outpatient Rehabilitation &  Therapy  [address]  P: [phone]  F: [phone] [] [facility]  Outpatient Rehabilitation &  Therapy  [address]  P: [phone]  F: [phone]  [] [facility] Outpatient Rehabilitation &  Therapy  [address]  P: [phone]  F: [phone]     Physical Therapy Daily Treatment Note    Date:  2024  Patient Name:  Clarisse Weir    :  2000  MRN: [id number]  Insurance: GENERIC AUTO INSURANCE   Medical Diagnosis: M54.12 (ICD-10-CM) - Radiculopathy, cervical region                          Rehab Codes: M54.2, M79.601  Onset Date: 24                 Next 's appt.:24 for 2nd epidurals  Visit# / total visits:   Cancels/No Shows: 0    Subjective:    Pain:  [x] Yes  [] No Location: neck  Pain Rating: (0-10 scale) 3/10  Pain altered Tx:  [x] No  [] Yes  Action:  Comments: No change in pain level on arrival. States she was sore after last session.

## 2024-11-19 ENCOUNTER — HOSPITAL ENCOUNTER (OUTPATIENT)
Dept: PHYSICAL THERAPY | Facility: CLINIC | Age: 24
Setting detail: THERAPIES SERIES
Discharge: HOME | End: 2024-11-19
Payer: OTHER MISCELLANEOUS

## 2024-11-19 PROCEDURE — 97110 THERAPEUTIC EXERCISES: CPT

## 2024-11-19 PROCEDURE — 97140 MANUAL THERAPY 1/> REGIONS: CPT

## 2024-11-19 NOTE — FLOWSHEET NOTE
treatments)  ? Pain:<3/10 pain to allow pt to perform ADLs in a more timely fashion  ? ROM:Cervical flexion and ext 45 deg ea, Rotation 60 deg or better to allow pt to look down and over her shoulder for ADLs easier  ? Function: Pt able to sleep 6-8 hrs without waking due to neck pain  Patient to be independent with home exercise program as demonstrated by performance with correct form without cues.     LTG: (to be met in 20 treatments)  Pt is able to hold normal posture for improved neck and shoulder mechanics  5/5 elbow extension strength and  strength equal as measured with dynamometer to aide in RTW full duty  NDI <10% limitation     Patient goals: increased mobility    Pt. Education:  [x] Yes  [] No  [] Reviewed Prior HEP/Ed  Method of Education: [x] Verbal  [x] Demo  [] Written  Access Code: [email or web address]  URL: [email or web address]  Date: 11/19/2024  Prepared by: Solange Solomon    Exercises  - Supine Deep Neck Flexor Training  - 2-3 x daily - 7 x weekly - 10 reps - 10 sec hold  - Supine Cervical Rotation AROM on Pillow  - 2-3 x daily - 7 x weekly - 3 sets - 10 reps  - Doorway Pec Stretch at 90 Degrees Abduction  - 2 x daily - 7 x weekly - 3 sets - 30sec hold  - Low Trap Setting at Wall  - 1 x daily - 7 x weekly - 2 sets - 10 reps  - Standing Bent Over Triceps Extension  - 1 x daily - 7 x weekly - 2 sets - 10 reps  - Standing Shoulder External Rotation with Resistance  - 1 x daily - 7 x weekly - 2 sets - 15 reps  - Standing Shoulder Row with Anchored Resistance  - 1 x daily - 7 x weekly - 2 sets - 15 reps  - Shoulder extension with resistance - Neutral  - 1 x daily - 7 x weekly - 2 sets - 15 reps  - Standing Tricep Extensions with Resistance  - 1 x daily - 7 x weekly - 2 sets - 10 reps  - Sidelying Shoulder External Rotation Dumbbell  - 1 x daily - 7 x weekly - 2 sets - 10 reps  - Sidelying Shoulder Horizontal Abduction  - 1 x daily - 7 x weekly - 2 sets - 10 reps  - Sidelying Shoulder Abduction Palm

## 2024-11-22 ENCOUNTER — HOSPITAL ENCOUNTER (OUTPATIENT)
Dept: PHYSICAL THERAPY | Facility: CLINIC | Age: 24
Setting detail: THERAPIES SERIES
Discharge: HOME | End: 2024-11-22
Payer: OTHER MISCELLANEOUS

## 2024-11-22 PROCEDURE — 97110 THERAPEUTIC EXERCISES: CPT

## 2024-11-22 PROCEDURE — 97140 MANUAL THERAPY 1/> REGIONS: CPT

## 2024-11-22 NOTE — FLOWSHEET NOTE
more timely fashion  ? ROM:Cervical flexion and ext 45 deg ea, Rotation 60 deg or better to allow pt to look down and over her shoulder for ADLs easier  ? Function: Pt able to sleep 6-8 hrs without waking due to neck pain  Patient to be independent with home exercise program as demonstrated by performance with correct form without cues.     LTG: (to be met in 20 treatments)  Pt is able to hold normal posture for improved neck and shoulder mechanics  5/5 elbow extension strength and  strength equal as measured with dynamometer to aide in RTW full duty  NDI <10% limitation     Patient goals: increased mobility    Pt. Education:  [x] Yes  [] No  [] Reviewed Prior HEP/Ed  Method of Education: [x] Verbal  [x] Demo  [x] Written  Access Code: [email or web address]  URL: [email or web address]  Date: 11/22/2024  Prepared by: Viviana Pierce    Exercises  - Supine Deep Neck Flexor Training  - 2-3 x daily - 7 x weekly - 10 reps - 10 sec hold  - Supine Cervical Rotation AROM on Pillow  - 2-3 x daily - 7 x weekly - 3 sets - 10 reps  - Doorway Pec Stretch at 90 Degrees Abduction  - 2 x daily - 7 x weekly - 3 sets - 30sec hold  - Low Trap Setting at Wall  - 1 x daily - 7 x weekly - 2 sets - 10 reps  - Standing Bent Over Triceps Extension  - 1 x daily - 7 x weekly - 2 sets - 10 reps  - Standing Shoulder External Rotation with Resistance  - 1 x daily - 7 x weekly - 2 sets - 15 reps  - Standing Shoulder Row with Anchored Resistance  - 1 x daily - 7 x weekly - 2 sets - 15 reps  - Shoulder extension with resistance - Neutral  - 1 x daily - 7 x weekly - 2 sets - 15 reps  - Standing Tricep Extensions with Resistance  - 1 x daily - 7 x weekly - 2 sets - 10 reps  - Sidelying Shoulder External Rotation Dumbbell  - 1 x daily - 7 x weekly - 2 sets - 10 reps  - Sidelying Shoulder Horizontal Abduction  - 1 x daily - 7 x weekly - 2 sets - 10 reps  - Sidelying Shoulder Abduction Palm Forward  - 1 x daily - 7 x weekly - 2 sets - 10 reps  - Prone

## 2024-11-26 ENCOUNTER — HOSPITAL ENCOUNTER (OUTPATIENT)
Dept: PHYSICAL THERAPY | Facility: CLINIC | Age: 24
Setting detail: THERAPIES SERIES
Discharge: HOME | End: 2024-11-26
Payer: OTHER MISCELLANEOUS

## 2024-11-26 PROCEDURE — 97140 MANUAL THERAPY 1/> REGIONS: CPT

## 2024-11-26 PROCEDURE — 97110 THERAPEUTIC EXERCISES: CPT

## 2024-11-26 NOTE — FLOWSHEET NOTE
[] [facility]  Outpatient Rehabilitation &  Therapy  [address]  [phone]  [phone] [] [facility]  Outpatient Rehabilitation &  Therapy  [address]  P: [phone]  F: [phone] [x] [facility]  Outpatient Rehabilitation &  Therapy  [address]  P: [phone]  F: [phone] [] [facility]  Outpatient Rehabilitation &  Therapy  [address]  [phone]  [phone] [] [facility]  Outpatient Rehabilitation &  Therapy  [address]   P: [phone]  F: [phone]  [] [facility]  Outpatient Rehabilitation &  Therapy  [address]  P: [phone]  F: [phone] [] [facility]  Outpatient Rehabilitation &  Therapy  [address]  P: [phone]  F: [phone] [] [facility]  Outpatient Rehabilitation &  Therapy  [address]  P: [phone]  F: [phone] [] [facility]  Outpatient Rehabilitation &  Therapy  [address]  P: [phone]  F: [phone]  [] [facility] Outpatient Rehabilitation &  Therapy  [address]  P: [phone]  F: [phone]     Physical Therapy Daily Treatment Note    Date:  2024  Patient Name:  Clarisse Weir    :  2000  MRN: [id number]  Insurance: [employer] INSURANCE   Medical Diagnosis: M54.12 (ICD-10-CM) - Radiculopathy, cervical region                          Rehab Codes: M54.2, M79.601  Onset Date: 24                 Next 's appt.:  prn  Visit# / total visits:   Cancels/No Shows: 0    Subjective:    Pain:  [] Yes  [x] No Location: neck  Pain Rating: (0-10 scale) 1/10  Pain altered Tx:  [x] No  [] Yes  Action:  Comments: Pt states she is feeling much better since epidural. Muscle soreness after last session however has been more

## 2024-12-02 ENCOUNTER — HOSPITAL ENCOUNTER (OUTPATIENT)
Dept: PHYSICAL THERAPY | Facility: CLINIC | Age: 24
Setting detail: THERAPIES SERIES
Discharge: HOME | End: 2024-12-02
Payer: OTHER MISCELLANEOUS

## 2024-12-02 PROCEDURE — 97110 THERAPEUTIC EXERCISES: CPT

## 2024-12-02 NOTE — FLOWSHEET NOTE
[] [facility]  Outpatient Rehabilitation &  Therapy  [address]  [phone]  [phone] [] [facility]  Outpatient Rehabilitation &  Therapy  [address]  P: [phone]  F: [phone] [x] [facility]  Outpatient Rehabilitation &  Therapy  [address]  P: [phone]  F: [phone] [] [facility]  Outpatient Rehabilitation &  Therapy  [address]  [phone]  [phone] [] [facility]  Outpatient Rehabilitation &  Therapy  [address]   P: [phone]  F: [phone]  [] [facility]  Outpatient Rehabilitation &  Therapy  [address]  P: [phone]  F: [phone] [] [facility]  Outpatient Rehabilitation &  Therapy  [address]  P: [phone]  F: [phone] [] [facility]  Outpatient Rehabilitation &  Therapy  [address]  P: [phone]  F: [phone] [] [facility]  Outpatient Rehabilitation &  Therapy  [address]  P: [phone]  F: [phone]  [] [facility] Outpatient Rehabilitation &  Therapy  [address]  P: [phone]  F: [phone]     Physical Therapy Daily Treatment Note    Date:  2024  Patient Name:  Clarisse Weir    :  2000  MRN: [id number]  Insurance: GENERIC AUTO INSURANCE   Medical Diagnosis: M54.12 (ICD-10-CM) - Radiculopathy, cervical region                          Rehab Codes: M54.2, M79.601  Onset Date: 24                 Next 's appt.:  prn  Visit# / total visits: 15  Cancels/No Shows: 0    Subjective:    Pain:  [] Yes  [x] No Location: neck  Pain Rating: (0-10 scale) 1/10  Pain altered Tx:  [x] No  [] Yes  Action:  Comments: Pt arrives with very minimal pain and denies any cervical tension. Continues to improve with tolerance to ex.

## 2024-12-04 ENCOUNTER — HOSPITAL ENCOUNTER (OUTPATIENT)
Dept: PHYSICAL THERAPY | Facility: CLINIC | Age: 24
Setting detail: THERAPIES SERIES
Discharge: HOME | End: 2024-12-04
Payer: OTHER MISCELLANEOUS

## 2024-12-04 PROCEDURE — 97110 THERAPEUTIC EXERCISES: CPT

## 2024-12-04 NOTE — FLOWSHEET NOTE
[] [facility]  Outpatient Rehabilitation &  Therapy  [address]  [phone]  [phone] [] [facility]  Outpatient Rehabilitation &  Therapy  [address]  P: [phone]  F: [phone] [x] [facility]  Outpatient Rehabilitation &  Therapy  [address]  P: [phone]  F: [phone] [] [facility]  Outpatient Rehabilitation &  Therapy  [address]  [phone]  [phone] [] [facility]  Outpatient Rehabilitation &  Therapy  [address]   P: [phone]  F: [phone]  [] [facility]  Outpatient Rehabilitation &  Therapy  [address]  P: [phone]  F: [phone] [] [facility]  Outpatient Rehabilitation &  Therapy  [address]  P: [phone]  F: [phone] [] [facility]  Outpatient Rehabilitation &  Therapy  [address]  P: [phone]  F: [phone] [] [facility]  Outpatient Rehabilitation &  Therapy  [address]  P: [phone]  F: [phone]  [] [facility] Outpatient Rehabilitation &  Therapy  [address]  P: [phone]  F: [phone]     Physical Therapy Daily Treatment Note     Date:  2024  Patient Name:  Clarisse Weir    :  2000  MRN: [id number]  Insurance: GENERIC [employer] INSURANCE   Medical Diagnosis: M54.12 (ICD-10-CM) - Radiculopathy, cervical region                          Rehab Codes: M54.2, M79.601  Onset Date: 24                 Next 's appt.:  prn  Visit# / total visits:   Cancels/No Shows: 0    Subjective:    Pain:  [] Yes  [x] No Location: neck  Pain Rating: (0-10 scale) 1/10  Pain altered Tx:  [x] No  [] Yes  Action:  Comments: Pt states she did not notice any soreness after last session and felt good. Min symptoms upon arrival and

## 2024-12-10 ENCOUNTER — TELEPHONE (OUTPATIENT)
Dept: PRIMARY CARE CLINIC | Age: 24
End: 2024-12-10

## 2024-12-10 ENCOUNTER — HOSPITAL ENCOUNTER (OUTPATIENT)
Dept: PHYSICAL THERAPY | Facility: CLINIC | Age: 24
Setting detail: THERAPIES SERIES
Discharge: HOME | End: 2024-12-10
Payer: OTHER MISCELLANEOUS

## 2024-12-10 PROCEDURE — 97110 THERAPEUTIC EXERCISES: CPT

## 2024-12-10 NOTE — TELEPHONE ENCOUNTER
Office received a call from Maeve at [facility] [employer] wanting to know if patient has been seen in our office. Writer explained that we are not able to give that information but can connect her with HIM for medical records    Maeve said she has a signed consent but didn't want to send it unless patient had been seen. Writer explained that we cannot verify that she is a patient in this office without having that signed consent and advised her to fax the consent over with any request that they have and we can send it to the medical record department     She verbalized understanding

## 2024-12-10 NOTE — FLOWSHEET NOTE
15 reps  - Standing Tricep Extensions with Resistance  - 1 x daily - 7 x weekly - 2 sets - 10 reps  - Sidelying Shoulder External Rotation Dumbbell  - 1 x daily - 7 x weekly - 2 sets - 10 reps  - Sidelying Shoulder Horizontal Abduction  - 1 x daily - 7 x weekly - 2 sets - 10 reps  - Sidelying Shoulder Abduction Palm Forward  - 1 x daily - 7 x weekly - 2 sets - 10 reps  - Prone Shoulder Row  - 1 x daily - 7 x weekly - 2 sets - 10 reps  - Prone Shoulder Extension - Single Arm  - 1 x daily - 7 x weekly - 2 sets - 10 reps      Patient Education  - Office Posture  Comprehension of Education:  [x] Verbalizes understanding.  [] Demonstrates understanding.  [] Needs review.  [] Demonstrates/verbalizes HEP/Ed previously given.     Plan: [x] Continue current frequency toward long and short term goals.    [x] Specific Instructions for subsequent treatments: See above      Time In: 8:03 AM     Time Out: 8:52 AM    Electronically signed by:  Solange Solomon PTA

## 2024-12-13 ENCOUNTER — HOSPITAL ENCOUNTER (OUTPATIENT)
Dept: PHYSICAL THERAPY | Facility: CLINIC | Age: 24
Setting detail: THERAPIES SERIES
Discharge: HOME | End: 2024-12-13
Payer: OTHER MISCELLANEOUS

## 2024-12-13 PROCEDURE — 97110 THERAPEUTIC EXERCISES: CPT

## 2024-12-13 NOTE — FLOWSHEET NOTE
[] [facility]  Outpatient Rehabilitation &  Therapy  [address]  [phone]  [phone] [] [facility]  Outpatient Rehabilitation &  Therapy  [address]  P: [phone]  F: [phone] [x] [facility]  Outpatient Rehabilitation &  Therapy  [address]  P: [phone]  F: [phone] [] [facility]  Outpatient Rehabilitation &  Therapy  [address]  [phone]  [phone] [] [facility]  Outpatient Rehabilitation &  Therapy  [address]   P: [phone]  F: [phone]  [] [facility]  Outpatient Rehabilitation &  Therapy  [address]  P: [phone]  F: [phone] [] [facility]  Outpatient Rehabilitation &  Therapy  [address]  P: [phone]  F: [phone] [] [facility]  Outpatient Rehabilitation &  Therapy  [address]  P: [phone]  F: [phone] [] [facility]  Outpatient Rehabilitation &  Therapy  [address]  P: [phone]  F: [phone]  [] [facility] Outpatient Rehabilitation &  Therapy  [address]  P: [phone]  F: [phone]     Physical Therapy Daily Treatment Note     Date:  2024  Patient Name:  Clarisse Weir    :  2000  MRN: [id number]  Insurance: [employer]IC [employer] INSURANCE   Medical Diagnosis: M54.12 (ICD-10-CM) - Radiculopathy, cervical region                           Rehab Codes: M54.2, M79.601  Onset Date: 24                 Next 's appt.:  prn  Visit# / total visits:   Cancels/No Shows: 0    Subjective:    Pain:  [] Yes  [x] No Location: neck  Pain Rating: (0-10 scale) 0/10  Pain altered Tx:  [x] No  [] Yes  Action:  Comments: No c/o pain this am. Notes was sore after last treatment, sharp pain through superior shoulder.

## 2024-12-16 ENCOUNTER — HOSPITAL ENCOUNTER (OUTPATIENT)
Dept: PHYSICAL THERAPY | Facility: CLINIC | Age: 24
Setting detail: THERAPIES SERIES
Discharge: HOME | End: 2024-12-16
Payer: OTHER MISCELLANEOUS

## 2024-12-16 PROCEDURE — 97110 THERAPEUTIC EXERCISES: CPT

## 2024-12-16 NOTE — FLOWSHEET NOTE
[] [facility]  Outpatient Rehabilitation &  Therapy  [address]  [phone]  [phone] [] [facility]  Outpatient Rehabilitation &  Therapy  [address]  P: [phone]  F: [phone] [x] [facility]  Outpatient Rehabilitation &  Therapy  [address]  P: [phone]  F: [phone] [] [facility]  Outpatient Rehabilitation &  Therapy  [address]  [phone]  [phone] [] [facility]  Outpatient Rehabilitation &  Therapy  [address]   P: [phone]  F: [phone]  [] [facility]  Outpatient Rehabilitation &  Therapy  [address]  P: [phone]  F: [phone] [] [facility]  Outpatient Rehabilitation &  Therapy  [address]  P: [phone]  F: [phone] [] [facility]  Outpatient Rehabilitation &  Therapy  [address]  P: [phone]  F: [phone] [] [facility]  Outpatient Rehabilitation &  Therapy  [address]  P: [phone]  F: [phone]  [] [facility] Outpatient Rehabilitation &  Therapy  [address]  P: [phone]  F: [phone]     Physical Therapy Daily Treatment Note     Date:  2024  Patient Name:  Clarisse Weir    :  2000  MRN: [id number]  Insurance: [employer] INSURANCE   Medical Diagnosis: M54.12 (ICD-10-CM) - Radiculopathy, cervical region                           Rehab Codes: M54.2, M79.601  Onset Date: 24                 Next 's appt.:  prn  Visit# / total visits:   Cancels/No Shows: 0    Subjective:    Pain:  [] Yes  [x] No Location: neck  Pain Rating: (0-10 scale) 0/10  Pain altered Tx:  [x] No  [] Yes  Action:  Comments: Pt reports she has been feeling well. No c/o pain.       Objective:  Modalities: MHP to cervical region

## 2024-12-18 ENCOUNTER — HOSPITAL ENCOUNTER (OUTPATIENT)
Dept: PHYSICAL THERAPY | Facility: CLINIC | Age: 24
Setting detail: THERAPIES SERIES
End: 2024-12-18
Payer: OTHER MISCELLANEOUS

## 2024-12-30 ENCOUNTER — HOSPITAL ENCOUNTER (OUTPATIENT)
Dept: PHYSICAL THERAPY | Facility: CLINIC | Age: 24
Setting detail: THERAPIES SERIES
Discharge: HOME | End: 2024-12-30
Payer: OTHER MISCELLANEOUS

## 2024-12-30 PROCEDURE — 97110 THERAPEUTIC EXERCISES: CPT

## 2024-12-30 NOTE — DISCHARGE SUMMARY
mobility    Treatment to Date:  [x] Therapeutic Exercise    [] Modalities:  [] Therapeutic Activity    [] Ultrasound  [] Electrical Stimulation  [] Gait Training     [] Massage       [] Lumbar/Cervical Traction  [] Neuromuscular Re-education [x] Cold/hotpack [] Iontophoresis: 4 mg/mL  [x] Instruction in Home Exercise Program                     Dexamethasone Sodium  [x] Manual Therapy             Phosphate 40-80 mAmin  [] Aquatic Therapy                   [] Vasocompression/    [] Other:             Game Ready    Discharge Status:     [] Pt recovered from conditions. Treatment goals were met.    [] Pt received maximum benefit. No further therapy indicated at this time.    [x] Pt to continue exercise/home instructions independently.    [] Therapy interrupted due to:    [] Pt has 2 or more no shows/cancels, is discontinued per our policy.    [] Pt has completed prescribed number of treatment sessions.    [] Other:         Electronically signed by JOSSUE CLARKE PT on 12/30/2024 at 8:10 AM      If you have any questions or concerns, please don't hesitate to call.  Thank you for your referral.

## 2024-12-30 NOTE — FLOWSHEET NOTE
sets - 10 reps  - Prone Shoulder Row  - 1 x daily - 7 x weekly - 2 sets - 10 reps  - Prone Shoulder Extension - Single Arm  - 1 x daily - 7 x weekly - 2 sets - 10 reps  - Wall Shoulder Horizontal Abduction/Adduction with a Towel  - 1 x daily - 7 x weekly - 2 sets - 10 reps  - Scaption with Dumbbells  - 1 x daily - 7 x weekly - 15 reps  - Standing Shoulder Flexion to 90 Degrees with Dumbbells  - 1 x daily - 7 x weekly - 1 sets - 15 reps  - Standing Shoulder Horizontal Abduction with Resistance  - 1 x daily - 7 x weekly - 1 sets - 15 reps      Patient Education  - Office Posture  Comprehension of Education:  [x] Verbalizes understanding.  [] Demonstrates understanding.  [] Needs review.  [] Demonstrates/verbalizes HEP/Ed previously given.     Plan: DC to [facility] at this time      Time In: 8:03 AM     Time Out: 8:49 AM    Electronically signed by:  JOSSUE CLARKE PT

## (undated) DEVICE — SUTURE MCRYL + SZ 5 0 L18IN ABSRB UD L13MM P 3 3 8 CIR PRIM MCP493G

## (undated) DEVICE — STERILE LATEX POWDER-FREE SURGICAL GLOVESWITH NITRILE COATING: Brand: PROTEXIS

## (undated) DEVICE — ARROWHEAD LOCAL PACK: Brand: MEDLINE INDUSTRIES, INC.

## (undated) DEVICE — ELECTRODE ELECSURG NDL 2.8 INX7.2 CM COAT INSUL EDGE

## (undated) DEVICE — INTENDED FOR TISSUE SEPARATION, AND OTHER PROCEDURES THAT REQUIRE A SHARP SURGICAL BLADE TO PUNCTURE OR CUT.: Brand: BARD-PARKER ® CARBON RIB-BACK BLADES

## (undated) DEVICE — SOLUTION IV IRRIG 500ML 0.9% SODIUM CHL 2F7123